# Patient Record
Sex: FEMALE | Race: WHITE | NOT HISPANIC OR LATINO | URBAN - METROPOLITAN AREA
[De-identification: names, ages, dates, MRNs, and addresses within clinical notes are randomized per-mention and may not be internally consistent; named-entity substitution may affect disease eponyms.]

---

## 2024-01-15 LAB
EXTERNAL ABO GROUPING: NORMAL
EXTERNAL ANTIBODY SCREEN: NORMAL
EXTERNAL CHLAMYDIA SCREEN: NEGATIVE
EXTERNAL GONORRHEA SCREEN: NEGATIVE
EXTERNAL HEMATOCRIT: 40.8 %
EXTERNAL HEMOGLOBIN: 13.2 G/DL
EXTERNAL HEPATITIS B SURFACE ANTIGEN: NEGATIVE
EXTERNAL HIV-1/2 AB-AG: NORMAL
EXTERNAL PLATELET COUNT: 266 K/ÂΜL
EXTERNAL RH FACTOR: NEGATIVE
EXTERNAL RUBELLA IGG QUANTITATION: NORMAL
EXTERNAL SYPHILIS TOTAL IGG/IGM SCREENING: NON REACTIVE

## 2024-04-02 ENCOUNTER — INITIAL PRENATAL (OUTPATIENT)
Dept: OBGYN CLINIC | Facility: CLINIC | Age: 29
End: 2024-04-02

## 2024-04-02 VITALS
BODY MASS INDEX: 26.73 KG/M2 | DIASTOLIC BLOOD PRESSURE: 74 MMHG | WEIGHT: 156.6 LBS | SYSTOLIC BLOOD PRESSURE: 112 MMHG | HEIGHT: 64 IN

## 2024-04-02 DIAGNOSIS — Z67.91 RH NEGATIVE, ANTEPARTUM: ICD-10-CM

## 2024-04-02 DIAGNOSIS — Z34.02 PRIMIGRAVIDA, SECOND TRIMESTER: ICD-10-CM

## 2024-04-02 DIAGNOSIS — Z3A.18 18 WEEKS GESTATION OF PREGNANCY: ICD-10-CM

## 2024-04-02 DIAGNOSIS — Z36.1 NEED FOR MATERNAL SERUM ALPHA-PROTEIN (MSAFP) SCREENING: Primary | ICD-10-CM

## 2024-04-02 DIAGNOSIS — O36.1920 KELL ISOIMMUNIZATION DURING PREGNANCY IN SECOND TRIMESTER, SINGLE OR UNSPECIFIED FETUS: ICD-10-CM

## 2024-04-02 DIAGNOSIS — O26.899 RH NEGATIVE, ANTEPARTUM: ICD-10-CM

## 2024-04-02 PROBLEM — O09.899 RUBELLA NON-IMMUNE STATUS, ANTEPARTUM: Status: ACTIVE | Noted: 2024-01-16

## 2024-04-02 PROBLEM — Z28.39 RUBELLA NON-IMMUNE STATUS, ANTEPARTUM: Status: ACTIVE | Noted: 2024-01-16

## 2024-04-02 PROBLEM — Z78.9 NONIMMUNE TO HEPATITIS B VIRUS: Status: ACTIVE | Noted: 2024-01-16

## 2024-04-02 PROBLEM — O36.1990 KELL ISOIMMUNIZATION DURING PREGNANCY: Status: ACTIVE | Noted: 2024-01-18

## 2024-04-02 PROCEDURE — PNV: Performed by: NURSE PRACTITIONER

## 2024-04-02 NOTE — PROGRESS NOTES
[facility] - [address], [address], [place] [address]    Assessment/Plan:  1. Need for maternal serum alpha-protein (MSAFP) screening  -     Alpha fetoprotein, maternal; Future  -     Alpha fetoprotein, maternal    2. Primigravida, second trimester  -     Ambulatory Referral to Maternal Fetal Medicine; Future; Expected date: 2024    3. 18 weeks gestation of pregnancy    4. 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 Rh negative, antepartum    Records scanned into chart, initial prenatal labs completed  18 weeks 0 days, EDC 9/3/2024 by LMP, confirmed by 9 week US on 2024.  Discussed AFP timing, lab slip given  MFM referral, needs anatomy scan  Return for OB intake 1-2 weeks, 2-3 weeks with provider.   Call office with any concerns.    Subjective:   Fabby Tatum is a 28 y.o.  female.  CC: Transfer of prenatal care      HPI:   28 year old  presents for transfer of prenatal care at 18 weeks of pregnancy.  Previous care at [facility], OMAR 9/3/2024 by LMP, confirmed by 9 week ultrasound. Pt states pregnancy uneventful thus far and has no complaints today.  Prenatal labs completed along with NIPT testing, which was negative.         Gyn History  Patient's last menstrual period was 2023 (exact date).       Last pap smear: Not on file    She  reports being sexually active and has had partner(s) who are male. She reports using the following method of birth control/protection: None.       OB History      No past medical history on file.     No past surgical history on file.     Social History     Tobacco Use    Smoking status: Never    Smokeless tobacco: Never   Vaping Use    Vaping status: Never Used   Substance Use Topics    Alcohol use: Not Currently     Alcohol/week: 1.0 standard drink of alcohol     Types: 1 Glasses of wine per week     "Drug use: Never          Current Outpatient Medications:     Ferrous Sulfate (IRON PO), Take 1 tablet by mouth daily, Disp: , Rfl:     Prenatal Vit-Iron Carbonyl-FA (prenatal multivitamin) TABS, , Disp: , Rfl:     She is allergic to minocycline..    ROS: Review of Systems negative except as noted in HPI    Objective:  /74 (BP Location: Left arm, Patient Position: Sitting, Cuff Size: Standard)   Ht 5' 3.5\" (1.613 m)   Wt 71 kg (156 lb 9.6 oz)   LMP 11/30/2023 (Exact Date)   Breastfeeding No   BMI 27.31 kg/m²      Physical Exam  Constitutional:       General: She is not in acute distress.     Appearance: Normal appearance.   Abdominal:      Palpations: Abdomen is soft.      Tenderness: There is no abdominal tenderness.      Comments: Gravid.  FH @ 2 fb below umbilicus.  by doppler.    Skin:     General: Skin is warm and dry.   Neurological:      Mental Status: She is alert.         "

## 2024-04-07 LAB
2ND TRIMESTER 4 SCREEN SERPL-IMP: NORMAL
AFP ADJ MOM SERPL: 1.15
AFP INTERP AMN-IMP: NORMAL
AFP INTERP SERPL-IMP: NORMAL
AFP INTERP SERPL-IMP: NORMAL
AFP SERPL-MCNC: 51.7 NG/ML
AGE AT DELIVERY: 29.7 YR
GA METHOD: NORMAL
GA: 18.3 WEEKS
IDDM PATIENT QL: NO
MULTIPLE PREGNANCY: NO
NEURAL TUBE DEFECT RISK FETUS: 7603 %

## 2024-04-19 ENCOUNTER — ROUTINE PRENATAL (OUTPATIENT)
Facility: HOSPITAL | Age: 29
End: 2024-04-19
Payer: COMMERCIAL

## 2024-04-19 VITALS
HEART RATE: 84 BPM | SYSTOLIC BLOOD PRESSURE: 128 MMHG | HEIGHT: 64 IN | DIASTOLIC BLOOD PRESSURE: 72 MMHG | WEIGHT: 156.4 LBS | BODY MASS INDEX: 26.7 KG/M2

## 2024-04-19 DIAGNOSIS — Z3A.20 20 WEEKS GESTATION OF PREGNANCY: ICD-10-CM

## 2024-04-19 DIAGNOSIS — O36.1920 KELL ISOIMMUNIZATION DURING PREGNANCY IN SECOND TRIMESTER, SINGLE OR UNSPECIFIED FETUS: Primary | ICD-10-CM

## 2024-04-19 PROCEDURE — 99243 OFF/OP CNSLTJ NEW/EST LOW 30: CPT | Performed by: OBSTETRICS & GYNECOLOGY

## 2024-04-19 PROCEDURE — 76817 TRANSVAGINAL US OBSTETRIC: CPT | Performed by: OBSTETRICS & GYNECOLOGY

## 2024-04-19 PROCEDURE — 76811 OB US DETAILED SNGL FETUS: CPT | Performed by: OBSTETRICS & GYNECOLOGY

## 2024-04-19 NOTE — PROGRESS NOTES
Ultrasound Probe Disinfection    A transvaginal ultrasound was performed.   Prior to use, disinfection was performed with High Level Disinfection Process ([employer]).  Probe serial number [id number]: [id number] was used.      Kika Alcaraz  04/19/24  12:36 PM

## 2024-04-19 NOTE — LETTER
April 23, 2024     MARIANA Hallman  [address] [facility]   [address] [place] [id number]    Patient: Fabby Tatum   YOB: 1995   Date of Visit: 4/19/2024       Dear Ms. Moreno:    Thank you for referring Fabby Tatum to me for evaluation. Below are my notes for this consultation.    If you have questions, please do not hesitate to call me. I look forward to following your patient along with you.         Sincerely,        Julien Vivas MD        CC: No Recipients    Julien Vivas MD  4/23/2024  4:29 PM  Sign when Signing Visit  A fetal ultrasound was completed. See Ob procedures in Epic for an interpretation and recommendations. Do not hesitate to contact us in [facility] if you have questions.    Julien Vivas MD, MSCE  Maternal Fetal Medicine

## 2024-04-23 NOTE — PROGRESS NOTES
A fetal ultrasound was completed. See Ob procedures in Epic for an interpretation and recommendations. Do not hesitate to contact us in [facility] if you have questions.    Julien Vivas MD, MSCE  Maternal Fetal Medicine

## 2024-04-25 ENCOUNTER — PATIENT OUTREACH (OUTPATIENT)
Dept: OBGYN CLINIC | Facility: CLINIC | Age: 29
End: 2024-04-25

## 2024-04-25 ENCOUNTER — INITIAL PRENATAL (OUTPATIENT)
Dept: OBGYN CLINIC | Facility: CLINIC | Age: 29
End: 2024-04-25
Payer: COMMERCIAL

## 2024-04-25 VITALS
SYSTOLIC BLOOD PRESSURE: 118 MMHG | DIASTOLIC BLOOD PRESSURE: 68 MMHG | BODY MASS INDEX: 26.12 KG/M2 | HEIGHT: 64 IN | WEIGHT: 153 LBS

## 2024-04-25 VITALS — WEIGHT: 153 LBS | DIASTOLIC BLOOD PRESSURE: 68 MMHG | SYSTOLIC BLOOD PRESSURE: 118 MMHG | BODY MASS INDEX: 26.68 KG/M2

## 2024-04-25 DIAGNOSIS — O36.1920 KELL ISOIMMUNIZATION DURING PREGNANCY IN SECOND TRIMESTER, SINGLE OR UNSPECIFIED FETUS: Primary | ICD-10-CM

## 2024-04-25 DIAGNOSIS — Z3A.21 21 WEEKS GESTATION OF PREGNANCY: Primary | ICD-10-CM

## 2024-04-25 DIAGNOSIS — O09.899 RUBELLA NON-IMMUNE STATUS, ANTEPARTUM: ICD-10-CM

## 2024-04-25 DIAGNOSIS — Z3A.21 21 WEEKS GESTATION OF PREGNANCY: ICD-10-CM

## 2024-04-25 DIAGNOSIS — Z28.39 RUBELLA NON-IMMUNE STATUS, ANTEPARTUM: ICD-10-CM

## 2024-04-25 DIAGNOSIS — O26.899 RH NEGATIVE, ANTEPARTUM: ICD-10-CM

## 2024-04-25 DIAGNOSIS — Z67.91 RH NEGATIVE, ANTEPARTUM: ICD-10-CM

## 2024-04-25 LAB
SL AMB  POCT GLUCOSE, UA: NORMAL
SL AMB POCT URINE PROTEIN: NORMAL

## 2024-04-25 PROCEDURE — 81002 URINALYSIS NONAUTO W/O SCOPE: CPT | Performed by: PHYSICIAN ASSISTANT

## 2024-04-25 PROCEDURE — PNV: Performed by: PHYSICIAN ASSISTANT

## 2024-04-25 PROCEDURE — NOBC: Performed by: PHYSICIAN ASSISTANT

## 2024-04-25 RX ORDER — EPINEPHRINE 0.3 MG/.3ML
INJECTION SUBCUTANEOUS
COMMUNITY
Start: 2023-01-12

## 2024-04-25 NOTE — ASSESSMENT & PLAN NOTE
18 week transfer from [facility] secondary to moving.  Continue routine prenatal care.   Return to office for ob check in 4 weeks.

## 2024-04-25 NOTE — PROGRESS NOTES
Routine Prenatal Visit  [facility] [address], [address], [place] [address]    Assessment/Plan:  Fabby is a 28 y.o. year old  at 21w0d who presents for routine prenatal visit.     1. 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   No risk to baby at this time per genetic consult at [facility], see care everywhere.       2. 21 weeks gestation of pregnancy  Assessment & Plan:  18 week transfer from [facility] secondary to moving.  Continue routine prenatal care.   Return to office for ob check in 4 weeks.     Orders:  -     POCT urine dip    3. Rubella non-immune status, antepartum  Assessment & Plan:  Plan postpartum MMR vaccine.       4. Rh negative, antepartum  Assessment & Plan:  Will plan Rhogam at 28 weeks.           Next OB Visit 4 weeks.    Subjective:     CC: Prenatal care    Fabby Tatum is a 28 y.o.  female who presents for routine prenatal care at 21w0d.  Pregnancy ROS: Good FM, No N/V, HA, cramping, VB, LOF, edema, domestic violence, or smoking. Tolerating PNV.        The following portions of the patient's history were reviewed and updated as appropriate: allergies, current medications, past family history, past medical history, obstetric history, gynecologic history, past social history, past surgical history and problem list.      Objective:  /68   Wt 69.4 kg (153 lb)   LMP 2023   BMI 26.68 kg/m²   Pregravid Weight/BMI: 65.8 kg (145 lb) (BMI 25.28)  Current Weight: 69.4 kg (153 lb)   Total Weight Gain: 3.629 kg (8 lb)   Pre- Vitals      Flowsheet Row Most Recent Value   Prenatal Assessment    Fetal Heart Rate 145   Fundal Height (cm) 21 cm   Movement Present   Prenatal Vitals    Blood Pressure 118/68   Weight - Scale 69.4 kg (153 lb)   Urine Albumin/Glucose    Dilation/Effacement/Station     Vaginal Drainage    Edema    LLE Edema None   RLE Edema None   Facial Edema None             General: Well appearing, no distress  Abdomen: Soft, gravid, nontender  Fundal Height: Appropriate for gestational age.  Extremities: Warm and well perfused.  Non tender.  Prenatal lab work scripts

## 2024-04-25 NOTE — PATIENT INSTRUCTIONS
Congratulations!! Please review our Pregnancy Essential Guide and [facility] L&D Virtual tour from our networks website.     [facility] Pregnancy Essentials Guide  [facility] ([email or web address])     [facility] ([employer])

## 2024-04-25 NOTE — ASSESSMENT & PLAN NOTE
Patient brought husbands testing confirming negative for K antigen, positive for k (lowercase) antigen.   No risk to baby at this time per genetic consult at [facility], see care everywhere.

## 2024-04-25 NOTE — PROGRESS NOTES
SW referred for initial prenatal assessment. Patient is , [id number] with an OMAR of 9/15/24. Patient is a transfer from [facility]. Patient presented with FOB ( Héctor), agreeable to speaking with SW.     Patient reports this is a planned pregnancy. She and FOB both work and drive. Patient denies needing information for MA/WIC/SNAP, parenting education, or baby supply resources today. Patient denies current or h/o mental health, substance use, DV/IPV, CYS involvement, and legal concerns. She indicates good support from FOB, friends, and family. She enjoys reading and shopping for stress relief.     No SW needs identified at this time, SW closing referral. Please re-refer as needed.

## 2024-04-25 NOTE — PROGRESS NOTES
OB INTAKE INTERVIEW  Patient is 28 y.o.y.o. who presents for OB intake at 21wks  She is accompanied by   during this encounter  The father of her baby (Héctor ) is  involved in the pregnancy and is 28 years old.      Last Menstrual Period: 2024 Ultrasound: Measured 9 weeks 6 days     Estimated Date of Delivery: 2024     Signs/Symptoms of Pregnancy  Current pregnancy symptoms:   Constipation no  Headaches no  Cramping/spotting no  PICA cravings no    Diabetes-  There is no height or weight on file to calculate BMI.  If patient has 1 or more, please order early 1 hour GTT  History of GDM no  BMI >35 no  History of PCOS or current metformin use no  History of LGA/macrosomic infant (4000g/9lbs) no    If patient has 2 or more, please order early 1 hour GTT  BMI>30 no  AMA no  First degree relative with type 2 diabetes no  History of chronic HTN, hyperlipidemia, elevated A1C no  High risk race (, , ,  or ) no    Hypertension- if you answer yes to any of the following, please order baseline preeclampsia labs (cbc, comprehensive metabolic panel, urine protein creatinine ratio, uric acid)  History of of chronic HTN no  History of gestational HTN no  History of preeclampsia, eclampsia, or HELLP syndrome no  History of diabetes no  History of lupus, autoimmune disease, kidney disease no    Thyroid- if yes order TSH with reflex T4  History of thyroid disease no    Bleeding Disorder or Hx of DVT-patient or first degree relative with history of. Order the following if not done previously.   (Factor V, antithrombin III, prothrombin gene mutation, protein C and S Ag, lupus anticoagulant, anticardiolipin, beta-2 glycoprotein)   no    OB/GYN-  History of abnormal pap smear no       Date of last pap smear 2023, Neg pap   History of HPV no  History of Herpes/HSV no  History of other STI (gonorrhea, chlamydia, trich) no  History of prior   no  History of prior  no  History of  delivery prior to 36 weeks 6 days no  History of blood transfusion no  Ok for blood transfusion Yes     Substance screening-   History of tobacco use no  Currently using tobacco no  Substance Use Screen Level:; No risk     MRSA Screening-   Does the pt have a hx of MRSA? no    Immunizations:  Influenza vaccine given this season yes, 2023   Discussed Tdap vaccine YES  Discussed COVID Vaccine Yes     Genetic/MFM-  Do you or your partner have a history of any of the following in yourselves or first degree relatives?  Cystic fibrosis no  Spinal muscular atrophy no  Hemoglobinopathy/Sickle Cell/Thalassemia no  Fragile X Intellectual Disability no    If yes, discuss Carrier Screening and recommend consultation with MFM/Genetic Counseling and place specific MFM Referral for.    If no, discuss Carrier Screening being completed once in a lifetime as a standard of care lab test. Place orders for Cystic Fibrosis Gene Test (ZZD464) and Spinal Muscular Atrophy DNA ([id number])    Had declined CF/SMA screening due to cost and insurance coverage.      Appointment for Nuchal Translucency Ultrasound at [facility] scheduled for Pt was seen by MFM on 24 and has follow- up appointment scheduled for 24.  NIPT done on 24-Reduced risk   MSAFP completed on 24-Reduced Risk   Interview education  [facility] Pregnancy Essentials Book reviewed, discussed and attached to their AVS Yes     Ambulatory referral to  placed   EPDS: 2       Prenatal lab work scripts  PN labs completed on 1/15/24  CBC-WNL   UA-LE-25, few bacteria   RPR-non reactive  HIV-non reactive  Hep B surface antibody--3.9   Hep B surface antigen -non reactive   RPR- non reactive   Urine chlamydia-negative  Urine gonorrhea-not detected    K antigen titer 16   Negative for K antigen   Blood type: A negative     Extra labs ordered:      Aspirin/Preeclampsia Screen    Risk Level Risk Factor Recommendation    LOW Prior Uncomplicated full-term delivery no No Aspirin recommendation        MODERATE Nulliparity YES Recommend low-dose aspirin if     BMI>30 no 2 or more moderate risk factors    Family History Preeclampsia (mother/sister) no     35yr old or greater no      or Low Socioeconomic no     IVF Pregnancy  no     Personal History Risks (low birth weight, prior adverse preg outcome, >10yr preg interval) no         HIGH History of Preeclampsia no Recommend low-dose aspirin if     Multifetal gestation no 1 or more high risk factors    Chronic HTN no     Type 1 or 2 Diabetes no     Renal Disease no     Autoimmune Disease  no      Contraindications to ASA therapy:  NSAID/ ASA allergy: no  Nasal polyps: no  Asthma with history of ASA induced bronchospasm: no  Relative contraindications:  History of GI bleed: no  Active peptic ulcer disease: no  Severe hepatic dysfunction: no    Patient should be recommended to take ASA 162mg during this pregnancy from 12-36wks to lower her risk of preeclampsia:  Low Risk       The patient has a history now or in prior pregnancy notable for:  Primigravida   See Below     Details that I feel the provider should be aware of:   Fabby is a 21 week OB transfer from [facility]. She desires to establish OB care and deliver at [facility].  All PN labs including NIPT and MSAFP completed, result are on the chart for review.   Adeola isoimmunization during pregnancy-[facility] request documentation of partner Héctor's testing to confirm he is Adeola negative. If the documentation cannot be procured then genetic counseling is recommended to ensure that all the information that is required is available to indeed confirm that the fetus is not at risk for [place] immunization.  Pt provided a copy of husbands blood work from 1/17/2024-Negative for K Antigen (copy scanned into chart)     RH incompatibility (A negative)   Follow-US recommended at 35 weeks-Scheduled for  8/2/24    PN1 visit scheduled  today after Intake. Reports given to Sandeep BETTENCOURT  The patient was oriented to our practice, the navigator role, reviewed delivering physicians and [facility]. All questions were answered.    Interviewed by: COLLINS Gregg RN

## 2024-04-30 ENCOUNTER — TELEPHONE (OUTPATIENT)
Dept: OBGYN CLINIC | Facility: CLINIC | Age: 29
End: 2024-04-30

## 2024-05-24 ENCOUNTER — ROUTINE PRENATAL (OUTPATIENT)
Dept: OBGYN CLINIC | Facility: CLINIC | Age: 29
End: 2024-05-24
Payer: COMMERCIAL

## 2024-05-24 VITALS
DIASTOLIC BLOOD PRESSURE: 68 MMHG | SYSTOLIC BLOOD PRESSURE: 120 MMHG | BODY MASS INDEX: 27.04 KG/M2 | WEIGHT: 158.4 LBS | HEIGHT: 64 IN

## 2024-05-24 DIAGNOSIS — O09.899 RUBELLA NON-IMMUNE STATUS, ANTEPARTUM: ICD-10-CM

## 2024-05-24 DIAGNOSIS — Z3A.25 25 WEEKS GESTATION OF PREGNANCY: ICD-10-CM

## 2024-05-24 DIAGNOSIS — Z28.39 RUBELLA NON-IMMUNE STATUS, ANTEPARTUM: ICD-10-CM

## 2024-05-24 DIAGNOSIS — Z78.9 NONIMMUNE TO HEPATITIS B VIRUS: ICD-10-CM

## 2024-05-24 DIAGNOSIS — Z36.89 ENCOUNTER FOR OTHER SPECIFIED ANTENATAL SCREENING: Primary | ICD-10-CM

## 2024-05-24 LAB
SL AMB  POCT GLUCOSE, UA: NORMAL
SL AMB POCT URINE PROTEIN: NORMAL

## 2024-05-24 PROCEDURE — PNV: Performed by: OBSTETRICS & GYNECOLOGY

## 2024-05-24 PROCEDURE — 81002 URINALYSIS NONAUTO W/O SCOPE: CPT | Performed by: OBSTETRICS & GYNECOLOGY

## 2024-05-24 NOTE — PROGRESS NOTES
"Routine Prenatal Visit  [facility] - [address], [address], [place] [address]    Assessment/Plan:  Fabby is a 28 y.o. year old  at 25w1d who presents for routine prenatal visit.     1. Encounter for other specified  screening  -     Glucose, 1H PG; Future  -     ABO/Rh; Future  -     CBC; Future  -     RPR, Rfx Qn RPR/Confirm TP; Future  -     Glucose, 1H PG  -     ABO/Rh  -     CBC  -     RPR, Rfx Qn RPR/Confirm TP  2. 25 weeks gestation of pregnancy  -     POCT urine dip  3. Rubella non-immune status, antepartum  4. Nonimmune to hepatitis B virus      Next OB Visit 4 weeks.    Subjective:     CC: Prenatal care    Fabby Tatum is a 28 y.o.  female who presents for routine prenatal care at 25w1d.  Pregnancy ROS: no leakage of fluid, pelvic pain, or vaginal bleeding.  normal fetal movement.    The following portions of the patient's history were reviewed and updated as appropriate: allergies, current medications, past family history, past medical history, obstetric history, gynecologic history, past social history, past surgical history and problem list.      Objective:  /68 (BP Location: Left arm, Patient Position: Sitting, Cuff Size: Standard)   Ht 5' 3.5\" (1.613 m)   Wt 71.8 kg (158 lb 6.4 oz)   LMP 2023   BMI 27.62 kg/m²   Pregravid Weight/BMI: 65.8 kg (145 lb) (BMI 25.28)  Current Weight: 71.8 kg (158 lb 6.4 oz)   Total Weight Gain: 6.078 kg (13 lb 6.4 oz)   Pre-Shun Vitals      Flowsheet Row Most Recent Value   Prenatal Assessment    Fetal Heart Rate 140   Fundal Height (cm) 25 cm   Movement Present   Prenatal Vitals    Blood Pressure 120/68   Weight - Scale 71.8 kg (158 lb 6.4 oz)   Urine Albumin/Glucose    Dilation/Effacement/Station    Vaginal Drainage    Draining Fluid No   Edema              General: Well appearing, no distress  Abdomen: Soft, gravid, nontender  Extremities: Non tender.  "

## 2024-06-12 ENCOUNTER — ROUTINE PRENATAL (OUTPATIENT)
Dept: OBGYN CLINIC | Facility: CLINIC | Age: 29
End: 2024-06-12
Payer: COMMERCIAL

## 2024-06-12 VITALS
SYSTOLIC BLOOD PRESSURE: 110 MMHG | WEIGHT: 160 LBS | DIASTOLIC BLOOD PRESSURE: 76 MMHG | HEIGHT: 64 IN | BODY MASS INDEX: 27.31 KG/M2

## 2024-06-12 DIAGNOSIS — O36.1920 KELL ISOIMMUNIZATION DURING PREGNANCY IN SECOND TRIMESTER, SINGLE OR UNSPECIFIED FETUS: Primary | ICD-10-CM

## 2024-06-12 DIAGNOSIS — O26.899 RH NEGATIVE, ANTEPARTUM: ICD-10-CM

## 2024-06-12 DIAGNOSIS — Z3A.27 27 WEEKS GESTATION OF PREGNANCY: ICD-10-CM

## 2024-06-12 DIAGNOSIS — Z28.39 RUBELLA NON-IMMUNE STATUS, ANTEPARTUM: ICD-10-CM

## 2024-06-12 DIAGNOSIS — Z67.91 RH NEGATIVE, ANTEPARTUM: ICD-10-CM

## 2024-06-12 DIAGNOSIS — O09.899 RUBELLA NON-IMMUNE STATUS, ANTEPARTUM: ICD-10-CM

## 2024-06-12 DIAGNOSIS — Z78.9 NONIMMUNE TO HEPATITIS B VIRUS: ICD-10-CM

## 2024-06-12 LAB
SL AMB  POCT GLUCOSE, UA: NORMAL
SL AMB POCT URINE PROTEIN: NORMAL

## 2024-06-12 PROCEDURE — PNV: Performed by: STUDENT IN AN ORGANIZED HEALTH CARE EDUCATION/TRAINING PROGRAM

## 2024-06-12 PROCEDURE — 81002 URINALYSIS NONAUTO W/O SCOPE: CPT | Performed by: STUDENT IN AN ORGANIZED HEALTH CARE EDUCATION/TRAINING PROGRAM

## 2024-06-12 NOTE — ASSESSMENT & PLAN NOTE
- Received 1 does of immunization 2/5/2024. Recommend that she completes this series with her PCP.

## 2024-06-12 NOTE — ASSESSMENT & PLAN NOTE
- Partner's antigen status is confirmed to be negative for K (big) antigen. Result scanned in Media tab. Per MFM at [facility], no further testing is indicated, as fetus is not at risk.

## 2024-06-12 NOTE — ASSESSMENT & PLAN NOTE
- PTL/PPROM/Bleeding precautions given.   - MFM consult report from level II ultrasound reviewed. Repeat US is scheduled in 3rd trimester, per [facility] recommendations.  - 28 week labs previously ordered. Patient plans to have these drawn tomorrow.   - Problem list updated, results console reviewed and updated with pertinent prenatal labs.  - PMH, PSH, medications reviewed and updated as needed.  - Return to office in 1 week for Rhogam, then 2 weeks for routine prenatal care.

## 2024-06-12 NOTE — PROGRESS NOTES
"Routine Prenatal Visit  [facility] - [address], [address], [place] [address]    Assessment/Plan:  Fabby is a 28 y.o. year old  at 27w6d who presents for routine prenatal visit.     1. 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 Result scanned in Media tab. Per MFM at [facility], no further testing is indicated, as fetus is not at risk.   2. Rh negative, antepartum  Assessment & Plan:  - Patient to return next week for Rhogam, as she plans to have labs drawn tomorrow.   3. Rubella non-immune status, antepartum  Assessment & Plan:  - Plan for MMR postpartum.   4. Nonimmune to hepatitis B virus  Assessment & Plan:  - Received 1 does of immunization 2024. Recommend that she completes this series with her PCP.   5. 27 weeks gestation of pregnancy  Assessment & Plan:  - PTL/PPROM/Bleeding precautions given.   - MFM consult report from level II ultrasound reviewed. Repeat US is scheduled in 3rd trimester, per MFM recommendations.  - 28 week labs previously ordered. Patient plans to have these drawn tomorrow.   - Problem list updated, results console reviewed and updated with pertinent prenatal labs.  - PMH, PSH, medications reviewed and updated as needed.  - Return to office in 1 week for Rhogam, then 2 weeks for routine prenatal care.   Orders:  -     POCT urine dip        Subjective:   Fabby Tatum is a 28 y.o.  who presents for routine prenatal care at 27w6d.  Complaints today: None  LOF: No; VB: No; Contractions: No; FM: Present    Objective:  /76 (BP Location: Left arm, Patient Position: Sitting, Cuff Size: Standard)   Ht 5' 3.5\" (1.613 m)   Wt 72.6 kg (160 lb)   LMP 2023   BMI 27.90 kg/m²     General: Well appearing, no distress  Respiratory: Unlabored breathing  Cardiovascular: Regular rate.  Abdomen: Soft, gravid, " nontender  Extremities: Warm and well perfused.  Non tender.    Pregravid Weight/BMI: 65.8 kg (145 lb) (BMI 25.28)  Current Weight: 72.6 kg (160 lb)   Total Weight Gain: 6.804 kg (15 lb)     Pre- Vitals      Flowsheet Row Most Recent Value   Prenatal Assessment    Fetal Heart Rate 145   Fundal Height (cm) 27 cm   Movement Present   Prenatal Vitals    Blood Pressure 110/76   Weight - Scale 72.6 kg (160 lb)   Urine Albumin/Glucose    Dilation/Effacement/Station    Vaginal Drainage    Draining Fluid No   Edema    LLE Edema None   RLE Edema None   Facial Edema None             Isaias Alvarado MD  2024 6:15 PM

## 2024-06-13 LAB
EXTERNAL HEMOGLOBIN: 12.4 G/DL
EXTERNAL PLATELET COUNT: 281 K/ÂΜL
EXTERNAL RH FACTOR: NEGATIVE
EXTERNAL SYPHILIS TOTAL IGG/IGM SCREENING: NORMAL

## 2024-06-14 LAB
ABO GROUP BLD: NORMAL
ERYTHROCYTE [DISTWIDTH] IN BLOOD BY AUTOMATED COUNT: 12.6 % (ref 11.7–15.4)
GLUCOSE 1H P 50 G GLC PO SERPL-MCNC: 101 MG/DL (ref 70–139)
HCT VFR BLD AUTO: 38.1 % (ref 34–46.6)
HGB BLD-MCNC: 12.4 G/DL (ref 11.1–15.9)
MCH RBC QN AUTO: 28.3 PG (ref 26.6–33)
MCHC RBC AUTO-ENTMCNC: 32.5 G/DL (ref 31.5–35.7)
MCV RBC AUTO: 87 FL (ref 79–97)
PLATELET # BLD AUTO: 281 X10E3/UL (ref 150–450)
RBC # BLD AUTO: 4.38 X10E6/UL (ref 3.77–5.28)
RH BLD: NEGATIVE
RPR SER QL: NON REACTIVE
WBC # BLD AUTO: 8.2 X10E3/UL (ref 3.4–10.8)

## 2024-06-17 ENCOUNTER — CLINICAL SUPPORT (OUTPATIENT)
Dept: POSTPARTUM | Facility: CLINIC | Age: 29
End: 2024-06-17

## 2024-06-17 DIAGNOSIS — Z32.2 ENCOUNTER FOR CHILDBIRTH INSTRUCTION: Primary | ICD-10-CM

## 2024-06-19 ENCOUNTER — CLINICAL SUPPORT (OUTPATIENT)
Dept: OBGYN CLINIC | Facility: CLINIC | Age: 29
End: 2024-06-19
Payer: COMMERCIAL

## 2024-06-19 VITALS — WEIGHT: 160 LBS | HEIGHT: 64 IN | BODY MASS INDEX: 27.31 KG/M2

## 2024-06-19 DIAGNOSIS — Z23 ENCOUNTER FOR IMMUNIZATION: Primary | ICD-10-CM

## 2024-06-19 DIAGNOSIS — Z29.13 NEED FOR RHOGAM DUE TO RH NEGATIVE MOTHER: ICD-10-CM

## 2024-06-19 LAB — EXTERNAL RHOGAM GIVEN?: YES

## 2024-06-19 PROCEDURE — 96372 THER/PROPH/DIAG INJ SC/IM: CPT

## 2024-06-19 PROCEDURE — 90471 IMMUNIZATION ADMIN: CPT

## 2024-06-19 PROCEDURE — 90715 TDAP VACCINE 7 YRS/> IM: CPT

## 2024-06-19 PROCEDURE — 90384 RH IG FULL-DOSE IM: CPT

## 2024-06-24 ENCOUNTER — TELEPHONE (OUTPATIENT)
Dept: OBGYN CLINIC | Facility: CLINIC | Age: 29
End: 2024-06-24

## 2024-06-24 NOTE — TELEPHONE ENCOUNTER
3rd trimester call - 29 wks 4 days - left message patient's voicemail to recall office    Patient only has next appointment on 6/27/2024 scheduled  Had Rhogam & TDAP on 6/16/2024  3rd tri labs completed 6/13/2024 (wnl)  Has follow up US scheduled for 8/2/2024

## 2024-06-27 ENCOUNTER — ROUTINE PRENATAL (OUTPATIENT)
Dept: OBGYN CLINIC | Facility: CLINIC | Age: 29
End: 2024-06-27
Payer: COMMERCIAL

## 2024-06-27 VITALS — SYSTOLIC BLOOD PRESSURE: 110 MMHG | DIASTOLIC BLOOD PRESSURE: 62 MMHG | WEIGHT: 159.8 LBS | BODY MASS INDEX: 27.86 KG/M2

## 2024-06-27 DIAGNOSIS — Z3A.30 30 WEEKS GESTATION OF PREGNANCY: Primary | ICD-10-CM

## 2024-06-27 DIAGNOSIS — Z67.91 RH NEGATIVE, ANTEPARTUM: ICD-10-CM

## 2024-06-27 DIAGNOSIS — O36.1930 KELL ISOIMMUNIZATION DURING PREGNANCY IN THIRD TRIMESTER, SINGLE OR UNSPECIFIED FETUS: ICD-10-CM

## 2024-06-27 DIAGNOSIS — Z28.39 RUBELLA NON-IMMUNE STATUS, ANTEPARTUM: ICD-10-CM

## 2024-06-27 DIAGNOSIS — O26.899 RH NEGATIVE, ANTEPARTUM: ICD-10-CM

## 2024-06-27 DIAGNOSIS — O09.899 RUBELLA NON-IMMUNE STATUS, ANTEPARTUM: ICD-10-CM

## 2024-06-27 LAB
SL AMB  POCT GLUCOSE, UA: NEGATIVE
SL AMB POCT URINE PROTEIN: NEGATIVE

## 2024-06-27 PROCEDURE — PNV: Performed by: NURSE PRACTITIONER

## 2024-06-27 PROCEDURE — 81002 URINALYSIS NONAUTO W/O SCOPE: CPT | Performed by: NURSE PRACTITIONER

## 2024-06-27 NOTE — TELEPHONE ENCOUNTER
"3rd trimester call -placed.        Overall how are you feeling?   Pt was seen today for 30 wk OB appointment, \"feeling good and no complaints.\"     Compliant with routine OB appointments? yes    Have you completed your 3rd trimester lab work?   3rd tri labs completed 6/13/2024 (wnl)  Had Rhogam & TDAP on 6/16/2024    Has follow up US scheduled for 8/2/2024  Have you reviewed the contents of 3rd trimester folder from office?    Have you decided on a pediatrician? YEs    If yes, who [facility]    If no, what are you looking for and request sent for outreach.   Questions on paperwork to go back to office? Received today 6/27/24, will return at her next appointment    Questions on the baby birth certificate forms?  Received today 6/27/24, will return at her next appointment    EPDS Scrore 0    Send link for the Hospital Readiness Video via [employer]      "

## 2024-06-27 NOTE — PROGRESS NOTES
Routine Prenatal Visit  [facility] - [address], [place] [address]    Assessment/Plan:  Fabby is a 28 y.o. year old  at 30w0d who presents for routine prenatal visit.     1. 30 weeks gestation of pregnancy  -     POCT urine dip  2. Rubella non-immune status, antepartum  Assessment & Plan:  Offer postpartum vaccine  3. Rh negative, antepartum  Assessment & Plan:  S/P Rhogam  4. [place] isoimmunization during pregnancy in third trimester, single or unspecified fetus  Assessment & Plan:  All records received, reviewed by MFM. Fetus not at risk  Growth US at 35 weeks    Doing well  Reviewed S/S  FMC, PTL  Call with any concerns  Next OB Visit 2 weeks.    Subjective:     CC: Prenatal care    Fabby Tatum is a 28 y.o.  female who presents for routine prenatal care at 30w0d. She has no complaints, feels well.   Pregnancy ROS: no leakage of fluid, pelvic pain, or vaginal bleeding.  normal fetal movement.    The following portions of the patient's history were reviewed and updated as appropriate: allergies, current medications, past family history, past medical history, obstetric history, gynecologic history, past social history, past surgical history and problem list.      Objective:  /62   Wt 72.5 kg (159 lb 12.8 oz)   LMP 2023   BMI 27.86 kg/m²   Pregravid Weight/BMI: 65.8 kg (145 lb) (BMI 25.28)  Current Weight: 72.5 kg (159 lb 12.8 oz)   Total Weight Gain: 6.713 kg (14 lb 12.8 oz)   Pre- Vitals      Flowsheet Row Most Recent Value   Prenatal Assessment    Fetal Heart Rate 146   Fundal Height (cm) 30 cm   Movement Present   Presentation Vertex   Prenatal Vitals    Blood Pressure 110/62   Weight - Scale 72.5 kg (159 lb 12.8 oz)   Urine Albumin/Glucose    Dilation/Effacement/Station    Vaginal Drainage    Draining Fluid No   Edema    LLE Edema None   RLE Edema None   Facial Edema None             General: Well appearing, no distress  Abdomen: Soft, gravid,  nontender  Extremities: Non tender.

## 2024-07-11 ENCOUNTER — ROUTINE PRENATAL (OUTPATIENT)
Dept: OBGYN CLINIC | Facility: CLINIC | Age: 29
End: 2024-07-11
Payer: COMMERCIAL

## 2024-07-11 VITALS
HEIGHT: 64 IN | WEIGHT: 162.8 LBS | BODY MASS INDEX: 27.79 KG/M2 | SYSTOLIC BLOOD PRESSURE: 116 MMHG | DIASTOLIC BLOOD PRESSURE: 82 MMHG

## 2024-07-11 DIAGNOSIS — Z28.39 RUBELLA NON-IMMUNE STATUS, ANTEPARTUM: ICD-10-CM

## 2024-07-11 DIAGNOSIS — Z3A.32 32 WEEKS GESTATION OF PREGNANCY: ICD-10-CM

## 2024-07-11 DIAGNOSIS — O26.899 RH NEGATIVE, ANTEPARTUM: ICD-10-CM

## 2024-07-11 DIAGNOSIS — O09.899 RUBELLA NON-IMMUNE STATUS, ANTEPARTUM: ICD-10-CM

## 2024-07-11 DIAGNOSIS — O36.1930 KELL ISOIMMUNIZATION DURING PREGNANCY IN THIRD TRIMESTER, SINGLE OR UNSPECIFIED FETUS: Primary | ICD-10-CM

## 2024-07-11 DIAGNOSIS — Z67.91 RH NEGATIVE, ANTEPARTUM: ICD-10-CM

## 2024-07-11 LAB
SL AMB  POCT GLUCOSE, UA: NORMAL
SL AMB POCT URINE PROTEIN: NORMAL

## 2024-07-11 PROCEDURE — PNV: Performed by: OBSTETRICS & GYNECOLOGY

## 2024-07-11 PROCEDURE — 81002 URINALYSIS NONAUTO W/O SCOPE: CPT | Performed by: OBSTETRICS & GYNECOLOGY

## 2024-07-11 NOTE — PROGRESS NOTES
"Routine Prenatal Visit  [facility] - [place]  [address] [place] [address]    Assessment/Plan:  Fabby is a 28 y.o. year old  at 32w0d who presents for routine prenatal visit.     1. Adeola isoimmunization during pregnancy in third trimester, single or unspecified fetus  Assessment & Plan:  Has growth for 35 weeks scheduled. Reviewed both our MFM and previous MFM records.   2. 32 weeks gestation of pregnancy  -     POCT urine dip  3. Rh negative, antepartum  4. Rubella non-immune status, antepartum        Subjective:   Fabby Tatum is a 28 y.o.  who presents for routine prenatal care at 32w0d.  Complaints today: Denies  LOF: -; VB: -; Contractions: -; FM: +    Objective:  /82 (BP Location: Left arm, Patient Position: Sitting, Cuff Size: Standard)   Ht 5' 3.5\" (1.613 m)   Wt 73.8 kg (162 lb 12.8 oz)   LMP 2023   BMI 28.39 kg/m²     General: Well appearing, no distress  Respiratory: Unlabored breathing  Abdomen: Soft, gravid, nontender  Extremities: Warm and well perfused.  Non tender.    Pregravid Weight/BMI: 65.8 kg (145 lb) (BMI 25.28)  Current Weight: 73.8 kg (162 lb 12.8 oz)   Total Weight Gain: 8.074 kg (17 lb 12.8 oz)     Pre-Shun Vitals      Flowsheet Row Most Recent Value   Prenatal Assessment    Fetal Heart Rate 133   Fundal Height (cm) 32 cm   Movement Present   Prenatal Vitals    Blood Pressure 116/82   Weight - Scale 73.8 kg (162 lb 12.8 oz)   Urine Albumin/Glucose    Dilation/Effacement/Station    Vaginal Drainage    Edema              Shil ZEFERINO Green, DO  2024 12:59 PM     "

## 2024-07-12 ENCOUNTER — TELEPHONE (OUTPATIENT)
Age: 29
End: 2024-07-12

## 2024-07-12 NOTE — TELEPHONE ENCOUNTER
Called and spoke to patient. Informed her that unfortunately no available appointments in [place]. Will add patient to waitlist. She states understanding and will keep 8/2 appointment if nothing else becomes available.

## 2024-07-12 NOTE — TELEPHONE ENCOUNTER
Patient is looking for [place] location.    Coming in for 8/2      [place].        She is aware as of right now no appointment until 8/19 in this location .          Please call if any cancellation

## 2024-07-18 ENCOUNTER — CLINICAL SUPPORT (OUTPATIENT)
Age: 29
End: 2024-07-18

## 2024-07-18 DIAGNOSIS — Z32.2 ENCOUNTER FOR CHILDBIRTH INSTRUCTION: Primary | ICD-10-CM

## 2024-07-25 ENCOUNTER — ROUTINE PRENATAL (OUTPATIENT)
Dept: OBGYN CLINIC | Facility: CLINIC | Age: 29
End: 2024-07-25
Payer: COMMERCIAL

## 2024-07-25 VITALS
HEIGHT: 64 IN | DIASTOLIC BLOOD PRESSURE: 72 MMHG | BODY MASS INDEX: 28 KG/M2 | SYSTOLIC BLOOD PRESSURE: 112 MMHG | WEIGHT: 164 LBS

## 2024-07-25 DIAGNOSIS — O26.899 RH NEGATIVE, ANTEPARTUM: ICD-10-CM

## 2024-07-25 DIAGNOSIS — Z28.39 RUBELLA NON-IMMUNE STATUS, ANTEPARTUM: ICD-10-CM

## 2024-07-25 DIAGNOSIS — Z67.91 RH NEGATIVE, ANTEPARTUM: ICD-10-CM

## 2024-07-25 DIAGNOSIS — O36.1930 KELL ISOIMMUNIZATION DURING PREGNANCY IN THIRD TRIMESTER, SINGLE OR UNSPECIFIED FETUS: Primary | ICD-10-CM

## 2024-07-25 DIAGNOSIS — Z3A.34 34 WEEKS GESTATION OF PREGNANCY: ICD-10-CM

## 2024-07-25 DIAGNOSIS — O09.899 RUBELLA NON-IMMUNE STATUS, ANTEPARTUM: ICD-10-CM

## 2024-07-25 LAB
SL AMB  POCT GLUCOSE, UA: NORMAL
SL AMB POCT URINE PROTEIN: NORMAL

## 2024-07-25 PROCEDURE — 81002 URINALYSIS NONAUTO W/O SCOPE: CPT | Performed by: OBSTETRICS & GYNECOLOGY

## 2024-07-25 PROCEDURE — PNV: Performed by: OBSTETRICS & GYNECOLOGY

## 2024-07-25 NOTE — PROGRESS NOTES
"Routine Prenatal Visit  [facility] - [place]  [address], [place] [address]    Assessment/Plan:  Fabby is a 28 y.o. year old  at 34w0d who presents for routine prenatal visit.     1. 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 Rh negative, antepartum  3. Rubella non-immune status, antepartum  4. 34 weeks gestation of pregnancy  -     POCT urine dip        Subjective:     CC: Prenatal care    Fabby Tatum is a 28 y.o.  female who presents for routine prenatal care at 34w0d.  Pregnancy ROS: no leakage of fluid, pelvic pain, or vaginal bleeding.  normal fetal movement.    The following portions of the patient's history were reviewed and updated as appropriate: allergies, current medications, past family history, past medical history, obstetric history, gynecologic history, past social history, past surgical history and problem list.      Objective:  /72 (BP Location: Left arm, Patient Position: Sitting, Cuff Size: Standard)   Ht 5' 3.5\" (1.613 m)   Wt 74.4 kg (164 lb)   LMP 2023   BMI 28.60 kg/m²   Pregravid Weight/BMI: 65.8 kg (145 lb) (BMI 25.28)  Current Weight: 74.4 kg (164 lb)   Total Weight Gain: 8.618 kg (19 lb)   Pre-Shun Vitals      Flowsheet Row Most Recent Value   Prenatal Assessment    Fetal Heart Rate 140   Fundal Height (cm) 34 cm   Movement Present   Presentation Vertex   Prenatal Vitals    Blood Pressure 112/72   Weight - Scale 74.4 kg (164 lb)   Urine Albumin/Glucose    Dilation/Effacement/Station    Vaginal Drainage    Edema              General: Well appearing, no distress  Respiratory: Unlabored breathing  Cardiovascular: Regular rate.  Abdomen: Soft, gravid, nontender  Fundal Height: Appropriate for gestational age.  Extremities: Warm and well perfused.  Non tender.  "

## 2024-08-02 ENCOUNTER — ULTRASOUND (OUTPATIENT)
Facility: HOSPITAL | Age: 29
End: 2024-08-02
Payer: COMMERCIAL

## 2024-08-02 VITALS
WEIGHT: 165 LBS | HEART RATE: 70 BPM | BODY MASS INDEX: 28.77 KG/M2 | DIASTOLIC BLOOD PRESSURE: 68 MMHG | SYSTOLIC BLOOD PRESSURE: 120 MMHG

## 2024-08-02 DIAGNOSIS — Z3A.35 35 WEEKS GESTATION OF PREGNANCY: Primary | ICD-10-CM

## 2024-08-02 DIAGNOSIS — O36.1930 KELL ISOIMMUNIZATION DURING PREGNANCY IN THIRD TRIMESTER, SINGLE OR UNSPECIFIED FETUS: ICD-10-CM

## 2024-08-02 DIAGNOSIS — Z36.89 ENCOUNTER FOR ULTRASOUND TO CHECK FETAL GROWTH: ICD-10-CM

## 2024-08-02 PROCEDURE — 76816 OB US FOLLOW-UP PER FETUS: CPT | Performed by: STUDENT IN AN ORGANIZED HEALTH CARE EDUCATION/TRAINING PROGRAM

## 2024-08-02 NOTE — PROGRESS NOTES
This patient received  care under my supervision on 24 at 35w1d gestational age at [facility].  The note is contained in the ultrasound report located under OB Procedures tab in Epic.  Please call our office at [phone] with questions.  -Aline Goldstein MD

## 2024-08-07 ENCOUNTER — TELEPHONE (OUTPATIENT)
Age: 29
End: 2024-08-07

## 2024-08-07 NOTE — TELEPHONE ENCOUNTER
Patient called the RX Refill Line. Message is being forwarded to the office.     Patient is requesting an order/prescription for a breast pump. She said she was given the order form from the office and filled it out electronically. However, the pump cannot get sent to her until the company gets a prescription from the office.    Please fax to:  [employer]  Fax# [phone]

## 2024-08-08 ENCOUNTER — ROUTINE PRENATAL (OUTPATIENT)
Dept: OBGYN CLINIC | Facility: CLINIC | Age: 29
End: 2024-08-08
Payer: COMMERCIAL

## 2024-08-08 VITALS
WEIGHT: 167 LBS | HEIGHT: 64 IN | BODY MASS INDEX: 28.51 KG/M2 | DIASTOLIC BLOOD PRESSURE: 78 MMHG | SYSTOLIC BLOOD PRESSURE: 120 MMHG

## 2024-08-08 DIAGNOSIS — O09.899 RUBELLA NON-IMMUNE STATUS, ANTEPARTUM: ICD-10-CM

## 2024-08-08 DIAGNOSIS — Z36.85 ANTENATAL SCREENING FOR STREPTOCOCCUS B: ICD-10-CM

## 2024-08-08 DIAGNOSIS — O36.1930 KELL ISOIMMUNIZATION DURING PREGNANCY IN THIRD TRIMESTER, SINGLE OR UNSPECIFIED FETUS: Primary | ICD-10-CM

## 2024-08-08 DIAGNOSIS — Z3A.36 36 WEEKS GESTATION OF PREGNANCY: ICD-10-CM

## 2024-08-08 DIAGNOSIS — Z28.39 RUBELLA NON-IMMUNE STATUS, ANTEPARTUM: ICD-10-CM

## 2024-08-08 DIAGNOSIS — Z67.91 RH NEGATIVE, ANTEPARTUM: ICD-10-CM

## 2024-08-08 DIAGNOSIS — O26.899 RH NEGATIVE, ANTEPARTUM: ICD-10-CM

## 2024-08-08 LAB
EXTERNAL GROUP B STREP ANTIGEN: NEGATIVE
SL AMB  POCT GLUCOSE, UA: NORMAL
SL AMB POCT URINE PROTEIN: NORMAL

## 2024-08-08 PROCEDURE — PNV: Performed by: PHYSICIAN ASSISTANT

## 2024-08-08 PROCEDURE — 81002 URINALYSIS NONAUTO W/O SCOPE: CPT | Performed by: PHYSICIAN ASSISTANT

## 2024-08-08 NOTE — PROGRESS NOTES
"Routine Prenatal Visit  [facility] - [address], [address], [place] [address]    Assessment/Plan:  Fabby is a 28 y.o. year old  at 36w0d who presents for routine prenatal visit.     1. 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 36 weeks gestation of pregnancy  Assessment & Plan:  GBS done today.   Delivery consent reviewed and signed. Risks of delivery reviewed, including bleeding, infection, damage to surrounding organs/structures (specifically bowel, bladder, vessels, nerves, ureters), need for operative vaginal delivery or  delivery, hysterectomy, need for blood transfusion, need for further surgery.   Reviewed s/s of labor, how and when to call.   Return to office for ob check in 1 week.   Orders:  -     POCT urine dip  3.  screening for streptococcus B  -     Strep B DNA probe, amplification  4. Rh negative, antepartum  5. Rubella non-immune status, antepartum      Next OB Visit 1 week.     Subjective:     CC: Prenatal care    Fabby Tatum is a 28 y.o.  female who presents for routine prenatal care at 36w0d.  Pregnancy ROS: Good FM, No N/V, HA, cramping, VB, LOF, edema, domestic violence, or smoking. Tolerating PNV.      The following portions of the patient's history were reviewed and updated as appropriate: allergies, current medications, past family history, past medical history, obstetric history, gynecologic history, past social history, past surgical history and problem list.      Objective:  /78 (BP Location: Right arm, Patient Position: Sitting, Cuff Size: Standard)   Ht 5' 3.5\" (1.613 m)   Wt 75.8 kg (167 lb)   LMP 2023   BMI 29.12 kg/m²   Pregravid Weight/BMI: 65.8 kg (145 lb) (BMI 25.28)  Current Weight: 75.8 kg (167 lb)   Total Weight Gain: 9.979 kg (22 lb)   Pre- Vitals      Flowsheet Row Most Recent Value   Prenatal Assessment    Fetal Heart Rate 138   Fundal Height (cm) 36 cm "   Movement Present   Presentation Vertex   Prenatal Vitals    Blood Pressure 120/78   Weight - Scale 75.8 kg (167 lb)   Urine Albumin/Glucose    Dilation/Effacement/Station    Cervical Dilation 0   Cervical Effacement 0   Fetal Station -3   Vaginal Drainage    Edema    LLE Edema None   RLE Edema None   Facial Edema None             General: Well appearing, no distress  Abdomen: Soft, gravid, nontender  Fundal Height: Appropriate for gestational age.  Extremities: Warm and well perfused.  Non tender.

## 2024-08-08 NOTE — ASSESSMENT & PLAN NOTE
GBS done today.   Delivery consent reviewed and signed. Risks of delivery reviewed, including bleeding, infection, damage to surrounding organs/structures (specifically bowel, bladder, vessels, nerves, ureters), need for operative vaginal delivery or  delivery, hysterectomy, need for blood transfusion, need for further surgery.   Reviewed s/s of labor, how and when to call.   Return to office for ob check in 1 week.

## 2024-08-10 LAB — GP B STREP DNA SPEC QL NAA+PROBE: NEGATIVE

## 2024-08-12 NOTE — TELEPHONE ENCOUNTER
Patient called the RX Refill Line. Message is being forwarded to the office.     Patient is requesting a prescription for Breast Pump please be Please fax to:  [employer] [facility] Fax# [phone].  Patient Filled out the Forms online and just needs the prescription sent into [employer].     Please contact patient at [phone] with any questions

## 2024-08-13 NOTE — TELEPHONE ENCOUNTER
Breast pump rx was faxed to number provided by patient in previous message earlier this morning. Will keep an eye for incoming [employer] Fax and will resend once received.

## 2024-08-13 NOTE — TELEPHONE ENCOUNTER
Patient called the RX refill line. Patient states that [employer] was resending the form to the office. They received the form but need a prescription sent for it with the form.

## 2024-08-15 ENCOUNTER — ROUTINE PRENATAL (OUTPATIENT)
Dept: OBGYN CLINIC | Facility: CLINIC | Age: 29
End: 2024-08-15
Payer: COMMERCIAL

## 2024-08-15 VITALS
HEIGHT: 64 IN | SYSTOLIC BLOOD PRESSURE: 110 MMHG | WEIGHT: 166.4 LBS | DIASTOLIC BLOOD PRESSURE: 72 MMHG | BODY MASS INDEX: 28.41 KG/M2

## 2024-08-15 DIAGNOSIS — O26.899 RH NEGATIVE, ANTEPARTUM: ICD-10-CM

## 2024-08-15 DIAGNOSIS — O09.899 RUBELLA NON-IMMUNE STATUS, ANTEPARTUM: ICD-10-CM

## 2024-08-15 DIAGNOSIS — Z3A.37 37 WEEKS GESTATION OF PREGNANCY: ICD-10-CM

## 2024-08-15 DIAGNOSIS — Z67.91 RH NEGATIVE, ANTEPARTUM: ICD-10-CM

## 2024-08-15 DIAGNOSIS — Z28.39 RUBELLA NON-IMMUNE STATUS, ANTEPARTUM: ICD-10-CM

## 2024-08-15 DIAGNOSIS — O36.1930 KELL ISOIMMUNIZATION DURING PREGNANCY IN THIRD TRIMESTER, SINGLE OR UNSPECIFIED FETUS: Primary | ICD-10-CM

## 2024-08-15 LAB
SL AMB  POCT GLUCOSE, UA: NORMAL
SL AMB POCT URINE PROTEIN: NORMAL

## 2024-08-15 PROCEDURE — 81002 URINALYSIS NONAUTO W/O SCOPE: CPT | Performed by: OBSTETRICS & GYNECOLOGY

## 2024-08-15 PROCEDURE — PNV: Performed by: OBSTETRICS & GYNECOLOGY

## 2024-08-15 NOTE — PROGRESS NOTES
"Routine Prenatal Visit  [facility] [place]  [address] [place] [address]    Assessment/Plan:  Fabby is a 28 y.o. year old  at 37w0d who presents for routine prenatal visit.     1. 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 Rh negative, antepartum  3. Rubella non-immune status, antepartum  4. 37 weeks gestation of pregnancy  Assessment & Plan:  GBS negative. No S/S labor.   Orders:  -     POCT urine dip        Subjective:     CC: Prenatal care    Fabby Tatum is a 28 y.o.  female who presents for routine prenatal care at 37w0d.  Pregnancy ROS: no leakage of fluid, pelvic pain, or vaginal bleeding.  normal fetal movement.    The following portions of the patient's history were reviewed and updated as appropriate: allergies, current medications, past family history, past medical history, obstetric history, gynecologic history, past social history, past surgical history and problem list.      Objective:  /72 (BP Location: Left arm, Patient Position: Sitting, Cuff Size: Standard)   Ht 5' 3.5\" (1.613 m)   Wt 75.5 kg (166 lb 6.4 oz)   LMP 2023   BMI 29.01 kg/m²   Pregravid Weight/BMI: 65.8 kg (145 lb) (BMI 25.28)  Current Weight: 75.5 kg (166 lb 6.4 oz)   Total Weight Gain: 9.707 kg (21 lb 6.4 oz)   Pre-Shun Vitals      Flowsheet Row Most Recent Value   Prenatal Assessment    Fetal Heart Rate 135   Fundal Height (cm) 37 cm   Movement Present   Presentation Vertex   Prenatal Vitals    Blood Pressure 110/72   Weight - Scale 75.5 kg (166 lb 6.4 oz)   Urine Albumin/Glucose    Dilation/Effacement/Station    Vaginal Drainage    Edema              General: Well appearing, no distress  Respiratory: Unlabored breathing  Cardiovascular: Regular rate.  Abdomen: Soft, gravid, nontender  Fundal Height: Appropriate for gestational age.  Extremities: Warm and well perfused.  Non tender.  "

## 2024-08-21 ENCOUNTER — ROUTINE PRENATAL (OUTPATIENT)
Dept: OBGYN CLINIC | Facility: CLINIC | Age: 29
End: 2024-08-21
Payer: COMMERCIAL

## 2024-08-21 VITALS
SYSTOLIC BLOOD PRESSURE: 112 MMHG | WEIGHT: 169 LBS | HEIGHT: 64 IN | BODY MASS INDEX: 28.85 KG/M2 | DIASTOLIC BLOOD PRESSURE: 78 MMHG

## 2024-08-21 DIAGNOSIS — O36.1930 KELL ISOIMMUNIZATION DURING PREGNANCY IN THIRD TRIMESTER, SINGLE OR UNSPECIFIED FETUS: Primary | ICD-10-CM

## 2024-08-21 DIAGNOSIS — Z28.39 RUBELLA NON-IMMUNE STATUS, ANTEPARTUM: ICD-10-CM

## 2024-08-21 DIAGNOSIS — O09.899 RUBELLA NON-IMMUNE STATUS, ANTEPARTUM: ICD-10-CM

## 2024-08-21 DIAGNOSIS — Z3A.37 37 WEEKS GESTATION OF PREGNANCY: ICD-10-CM

## 2024-08-21 DIAGNOSIS — O26.899 RH NEGATIVE, ANTEPARTUM: ICD-10-CM

## 2024-08-21 DIAGNOSIS — Z67.91 RH NEGATIVE, ANTEPARTUM: ICD-10-CM

## 2024-08-21 LAB
SL AMB  POCT GLUCOSE, UA: NORMAL
SL AMB POCT URINE PROTEIN: NORMAL

## 2024-08-21 PROCEDURE — 81002 URINALYSIS NONAUTO W/O SCOPE: CPT | Performed by: STUDENT IN AN ORGANIZED HEALTH CARE EDUCATION/TRAINING PROGRAM

## 2024-08-21 PROCEDURE — PNV: Performed by: STUDENT IN AN ORGANIZED HEALTH CARE EDUCATION/TRAINING PROGRAM

## 2024-08-21 NOTE — PROGRESS NOTES
"Routine Prenatal Visit  [facility] - [address], [address], [place] [address]    Assessment/Plan:  Fabby is a 28 y.o. year old  at 37w6d who presents for routine prenatal visit.     1. 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 No further testing indicated.   2. Rh negative, antepartum  3. Rubella non-immune status, antepartum  4. 37 weeks gestation of pregnancy  Assessment & Plan:  - Labor/Bleeding/ROM precautions given. Kick counts reviewed.  - GBS negative result reviewed.   - Reviewed timing of delivery, including option for elective induction of labor as early as 39 weeks versus awaiting spontaneous onset of labor. Will plan for assessment of cervical dilation at 39 weeks.   - Problem list updated, results console reviewed and updated with pertinent prenatal labs.  - PMH, PSH, medications reviewed and updated as needed  - Return to office in 1 wk(s) for routine prenatal care    Orders:  -     POCT urine dip        Subjective:   Fabby Tatum is a 28 y.o.  who presents for routine prenatal care at 37w6d.  Complaints today: None  LOF: No; VB: No; Contractions: No; FM: Present and normal    Objective:  /78 (BP Location: Left arm, Patient Position: Sitting, Cuff Size: Standard)   Ht 5' 3.5\" (1.613 m)   Wt 76.7 kg (169 lb)   LMP 2023   BMI 29.47 kg/m²     General: Well appearing, no distress  Respiratory: Unlabored breathing  Cardiovascular: Regular rate.  Abdomen: Soft, gravid, nontender  Extremities: Warm and well perfused.  Non tender.    Pregravid Weight/BMI: 65.8 kg (145 lb) (BMI 25.28)  Current Weight: 76.7 kg (169 lb)   Total Weight Gain: 10.9 kg (24 lb)     Pre- Vitals      Flowsheet Row Most Recent Value   Prenatal Assessment    Fetal Heart Rate 135   Fundal Height (cm) 37 cm   Movement Present   Presentation Vertex   Prenatal Vitals    Blood Pressure 112/78   Weight - " Scale 76.7 kg (169 lb)   Urine Albumin/Glucose    Dilation/Effacement/Station    Vaginal Drainage    Draining Fluid No   Edema    LLE Edema None   RLE Edema None   Facial Edema None             Isaias Alvarado MD  8/21/2024 10:46 AM

## 2024-08-21 NOTE — ASSESSMENT & PLAN NOTE
- Labor/Bleeding/ROM precautions given. Kick counts reviewed.  - GBS negative result reviewed.   - Reviewed timing of delivery, including option for elective induction of labor as early as 39 weeks versus awaiting spontaneous onset of labor. Will plan for assessment of cervical dilation at 39 weeks.   - Problem list updated, results console reviewed and updated with pertinent prenatal labs.  - PMH, PSH, medications reviewed and updated as needed  - Return to office in 1 wk(s) for routine prenatal care

## 2024-08-28 ENCOUNTER — ROUTINE PRENATAL (OUTPATIENT)
Dept: OBGYN CLINIC | Facility: CLINIC | Age: 29
End: 2024-08-28
Payer: COMMERCIAL

## 2024-08-28 VITALS
BODY MASS INDEX: 28.17 KG/M2 | SYSTOLIC BLOOD PRESSURE: 112 MMHG | WEIGHT: 165 LBS | DIASTOLIC BLOOD PRESSURE: 74 MMHG | HEIGHT: 64 IN

## 2024-08-28 DIAGNOSIS — Z3A.38 38 WEEKS GESTATION OF PREGNANCY: ICD-10-CM

## 2024-08-28 DIAGNOSIS — O36.1930 KELL ISOIMMUNIZATION DURING PREGNANCY IN THIRD TRIMESTER, SINGLE OR UNSPECIFIED FETUS: Primary | ICD-10-CM

## 2024-08-28 DIAGNOSIS — Z67.91 RH NEGATIVE, ANTEPARTUM: ICD-10-CM

## 2024-08-28 DIAGNOSIS — Z28.39 RUBELLA NON-IMMUNE STATUS, ANTEPARTUM: ICD-10-CM

## 2024-08-28 DIAGNOSIS — O26.899 RH NEGATIVE, ANTEPARTUM: ICD-10-CM

## 2024-08-28 DIAGNOSIS — O09.899 RUBELLA NON-IMMUNE STATUS, ANTEPARTUM: ICD-10-CM

## 2024-08-28 LAB
SL AMB  POCT GLUCOSE, UA: NORMAL
SL AMB POCT URINE PROTEIN: NORMAL

## 2024-08-28 PROCEDURE — PNV: Performed by: STUDENT IN AN ORGANIZED HEALTH CARE EDUCATION/TRAINING PROGRAM

## 2024-08-28 PROCEDURE — 81002 URINALYSIS NONAUTO W/O SCOPE: CPT | Performed by: STUDENT IN AN ORGANIZED HEALTH CARE EDUCATION/TRAINING PROGRAM

## 2024-08-28 NOTE — PROGRESS NOTES
"Routine Prenatal Visit  [facility] - [address], [address], [place] [address]    Assessment/Plan:  Fabby is a 28 y.o. year old  at 38w6d who presents for routine prenatal visit.     1. 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 Rubella non-immune status, antepartum  3. Rh negative, antepartum  4. 38 weeks gestation of pregnancy  Assessment & Plan:  - Labor/Bleeding/ROM precautions given. Kick counts reviewed.  - GBS negative result reviewed.   - Reviewed timing of delivery, including option for elective induction of labor as early as 39 weeks versus awaiting spontaneous onset of labor. Patient desires elective induction. Her cervix is unfavorable. Per her request, she was scheduled for cervical ripening on  at 20:00. Confirmed with L&D registration.  - Problem list updated, results console reviewed and updated with pertinent prenatal labs.  - PMH, PSH, medications reviewed and updated as needed  - Return to office in 1 wk(s) for repeat cervical exam to see if ripening is still indicated.   Orders:  -     POCT urine dip        Subjective:   Fabby Tatum is a 28 y.o.  who presents for routine prenatal care at 38w6d.  Complaints today: No  LOF: No; VB: No; Contractions: No; FM: Present and normal    Objective:  /74 (BP Location: Left arm, Patient Position: Sitting, Cuff Size: Standard)   Ht 5' 3.5\" (1.613 m)   Wt 74.8 kg (165 lb)   LMP 2023   BMI 28.77 kg/m²     General: Well appearing, no distress  Respiratory: Unlabored breathing  Cardiovascular: Regular rate.  Abdomen: Soft, gravid, nontender  Extremities: Warm and well perfused.  Non tender.    Pregravid Weight/BMI: 65.8 kg (145 lb) (BMI 25.28)  Current Weight: 74.8 kg (165 lb)   Total Weight Gain: 9.072 kg (20 lb)     Pre-Shun Vitals      Flowsheet Row Most Recent Value   Prenatal Assessment    Fetal Heart Rate 125   Fundal Height (cm) 39 cm   Movement Present "   Presentation Vertex   Prenatal Vitals    Blood Pressure 112/74   Weight - Scale 74.8 kg (165 lb)   Urine Albumin/Glucose    Dilation/Effacement/Station    Cervical Dilation 1   Cervical Effacement 50   Fetal Station -2   Vaginal Drainage    Draining Fluid No   Edema    LLE Edema None   RLE Edema None   Facial Edema None             Isaias Alvarado MD  8/28/2024 2:42 PM

## 2024-08-28 NOTE — ASSESSMENT & PLAN NOTE
- Labor/Bleeding/ROM precautions given. Kick counts reviewed.  - GBS negative result reviewed.   - Reviewed timing of delivery, including option for elective induction of labor as early as 39 weeks versus awaiting spontaneous onset of labor. Patient desires elective induction. Her cervix is unfavorable. Per her request, she was scheduled for cervical ripening on 9/4 at 20:00. Confirmed with L&D registration.  - Problem list updated, results console reviewed and updated with pertinent prenatal labs.  - PMH, PSH, medications reviewed and updated as needed  - Return to office in 1 wk(s) for repeat cervical exam to see if ripening is still indicated.

## 2024-09-02 ENCOUNTER — ANESTHESIA (INPATIENT)
Dept: ANESTHESIOLOGY | Facility: HOSPITAL | Age: 29
End: 2024-09-02
Payer: COMMERCIAL

## 2024-09-02 ENCOUNTER — ANESTHESIA EVENT (INPATIENT)
Dept: ANESTHESIOLOGY | Facility: HOSPITAL | Age: 29
End: 2024-09-02
Payer: COMMERCIAL

## 2024-09-02 ENCOUNTER — NURSE TRIAGE (OUTPATIENT)
Dept: OTHER | Facility: OTHER | Age: 29
End: 2024-09-02

## 2024-09-02 ENCOUNTER — HOSPITAL ENCOUNTER (INPATIENT)
Facility: HOSPITAL | Age: 29
LOS: 1 days | Discharge: HOME | End: 2024-09-03
Attending: STUDENT IN AN ORGANIZED HEALTH CARE EDUCATION/TRAINING PROGRAM | Admitting: STUDENT IN AN ORGANIZED HEALTH CARE EDUCATION/TRAINING PROGRAM
Payer: COMMERCIAL

## 2024-09-02 PROBLEM — Z3A.39 39 WEEKS GESTATION OF PREGNANCY: Status: ACTIVE | Noted: 2024-04-25

## 2024-09-02 LAB
ABO GROUP BLD: NORMAL
ABO GROUP BLD: NORMAL
BASE EXCESS BLDCOV CALC-SCNC: -4.6 MMOL/L (ref 1–9)
BLD GP AB SCN SERPL QL: POSITIVE
BLD GP AB SCN SERPL QL: POSITIVE
BLOOD GROUP ANTIBODIES SERPL: NORMAL
BLOOD GROUP ANTIBODIES SERPL: NORMAL
ERYTHROCYTE [DISTWIDTH] IN BLOOD BY AUTOMATED COUNT: 14.8 % (ref 11.6–15.1)
FETAL CELL SCN BLD QL ROSETTE: NEGATIVE
HCO3 BLDCOV-SCNC: 19.1 MMOL/L (ref 12.2–28.6)
HCT VFR BLD AUTO: 38.1 % (ref 34.8–46.1)
HGB BLD-MCNC: 12 G/DL (ref 11.5–15.4)
HOLD SPECIMEN: NORMAL
MCH RBC QN AUTO: 25.7 PG (ref 26.8–34.3)
MCHC RBC AUTO-ENTMCNC: 31.5 G/DL (ref 31.4–37.4)
MCV RBC AUTO: 82 FL (ref 82–98)
OXYHGB MFR BLDCOV: 49.6 %
PCO2 BLDCOV: 32.1 MM HG (ref 27–43)
PH BLDCOV: 7.39 [PH] (ref 7.19–7.49)
PLATELET # BLD AUTO: 245 THOUSANDS/UL (ref 149–390)
PMV BLD AUTO: 10.2 FL (ref 8.9–12.7)
PO2 BLDCOV: 22.6 MM HG (ref 15–45)
RBC # BLD AUTO: 4.67 MILLION/UL (ref 3.81–5.12)
RH BLD: NEGATIVE
RH BLD: NEGATIVE
SAO2 % BLDCOV: 11.2 ML/DL
SPECIMEN EXPIRATION DATE: NORMAL
WBC # BLD AUTO: 12.95 THOUSAND/UL (ref 4.31–10.16)

## 2024-09-02 PROCEDURE — 86850 RBC ANTIBODY SCREEN: CPT | Performed by: OBSTETRICS & GYNECOLOGY

## 2024-09-02 PROCEDURE — 59400 OBSTETRICAL CARE: CPT | Performed by: OBSTETRICS & GYNECOLOGY

## 2024-09-02 PROCEDURE — 10907ZC DRAINAGE OF AMNIOTIC FLUID, THERAPEUTIC FROM PRODUCTS OF CONCEPTION, VIA NATURAL OR ARTIFICIAL OPENING: ICD-10-PCS | Performed by: OBSTETRICS & GYNECOLOGY

## 2024-09-02 PROCEDURE — 86900 BLOOD TYPING SEROLOGIC ABO: CPT | Performed by: OBSTETRICS & GYNECOLOGY

## 2024-09-02 PROCEDURE — 85027 COMPLETE CBC AUTOMATED: CPT | Performed by: OBSTETRICS & GYNECOLOGY

## 2024-09-02 PROCEDURE — 86905 BLOOD TYPING RBC ANTIGENS: CPT

## 2024-09-02 PROCEDURE — 86901 BLOOD TYPING SEROLOGIC RH(D): CPT | Performed by: OBSTETRICS & GYNECOLOGY

## 2024-09-02 PROCEDURE — NC001 PR NO CHARGE: Performed by: OBSTETRICS & GYNECOLOGY

## 2024-09-02 PROCEDURE — 99202 OFFICE O/P NEW SF 15 MIN: CPT

## 2024-09-02 PROCEDURE — 0KQM0ZZ REPAIR PERINEUM MUSCLE, OPEN APPROACH: ICD-10-PCS | Performed by: OBSTETRICS & GYNECOLOGY

## 2024-09-02 PROCEDURE — 86780 TREPONEMA PALLIDUM: CPT | Performed by: OBSTETRICS & GYNECOLOGY

## 2024-09-02 PROCEDURE — 88307 TISSUE EXAM BY PATHOLOGIST: CPT | Performed by: PATHOLOGY

## 2024-09-02 PROCEDURE — 82805 BLOOD GASES W/O2 SATURATION: CPT | Performed by: OBSTETRICS & GYNECOLOGY

## 2024-09-02 PROCEDURE — 86870 RBC ANTIBODY IDENTIFICATION: CPT | Performed by: OBSTETRICS & GYNECOLOGY

## 2024-09-02 PROCEDURE — 85461 HEMOGLOBIN FETAL: CPT | Performed by: OBSTETRICS & GYNECOLOGY

## 2024-09-02 PROCEDURE — 4A1HXCZ MONITORING OF PRODUCTS OF CONCEPTION, CARDIAC RATE, EXTERNAL APPROACH: ICD-10-PCS | Performed by: OBSTETRICS & GYNECOLOGY

## 2024-09-02 RX ORDER — BUPIVACAINE HYDROCHLORIDE 2.5 MG/ML
30 INJECTION, SOLUTION EPIDURAL; INFILTRATION; INTRACAUDAL ONCE AS NEEDED
Status: DISCONTINUED | OUTPATIENT
Start: 2024-09-02 | End: 2024-09-02

## 2024-09-02 RX ORDER — OXYTOCIN/RINGER'S LACTATE 30/500 ML
PLASTIC BAG, INJECTION (ML) INTRAVENOUS
Status: COMPLETED
Start: 2024-09-02 | End: 2024-09-02

## 2024-09-02 RX ORDER — SODIUM CHLORIDE, SODIUM LACTATE, POTASSIUM CHLORIDE, CALCIUM CHLORIDE 600; 310; 30; 20 MG/100ML; MG/100ML; MG/100ML; MG/100ML
125 INJECTION, SOLUTION INTRAVENOUS CONTINUOUS
Status: DISCONTINUED | OUTPATIENT
Start: 2024-09-02 | End: 2024-09-02

## 2024-09-02 RX ORDER — ROPIVACAINE HYDROCHLORIDE 2 MG/ML
INJECTION, SOLUTION EPIDURAL; INFILTRATION; PERINEURAL CONTINUOUS PRN
Status: DISCONTINUED | OUTPATIENT
Start: 2024-09-02 | End: 2024-09-02 | Stop reason: HOSPADM

## 2024-09-02 RX ORDER — ONDANSETRON 2 MG/ML
4 INJECTION INTRAMUSCULAR; INTRAVENOUS EVERY 6 HOURS PRN
Status: DISCONTINUED | OUTPATIENT
Start: 2024-09-02 | End: 2024-09-02

## 2024-09-02 RX ORDER — BENZOCAINE/MENTHOL 6 MG-10 MG
1 LOZENGE MUCOUS MEMBRANE DAILY PRN
Status: DISCONTINUED | OUTPATIENT
Start: 2024-09-02 | End: 2024-09-03 | Stop reason: HOSPADM

## 2024-09-02 RX ORDER — OXYTOCIN/RINGER'S LACTATE 30/500 ML
250 PLASTIC BAG, INJECTION (ML) INTRAVENOUS ONCE
Status: COMPLETED | OUTPATIENT
Start: 2024-09-02 | End: 2024-09-02

## 2024-09-02 RX ORDER — CALCIUM CARBONATE 500 MG/1
1000 TABLET, CHEWABLE ORAL DAILY PRN
Status: DISCONTINUED | OUTPATIENT
Start: 2024-09-02 | End: 2024-09-03 | Stop reason: HOSPADM

## 2024-09-02 RX ORDER — BUPIVACAINE HYDROCHLORIDE 2.5 MG/ML
INJECTION, SOLUTION EPIDURAL; INFILTRATION; INTRACAUDAL AS NEEDED
Status: DISCONTINUED | OUTPATIENT
Start: 2024-09-02 | End: 2024-09-02 | Stop reason: HOSPADM

## 2024-09-02 RX ORDER — IBUPROFEN 600 MG/1
600 TABLET, FILM COATED ORAL EVERY 6 HOURS PRN
Status: DISCONTINUED | OUTPATIENT
Start: 2024-09-02 | End: 2024-09-03 | Stop reason: HOSPADM

## 2024-09-02 RX ORDER — ONDANSETRON 2 MG/ML
4 INJECTION INTRAMUSCULAR; INTRAVENOUS EVERY 8 HOURS PRN
Status: DISCONTINUED | OUTPATIENT
Start: 2024-09-02 | End: 2024-09-03 | Stop reason: HOSPADM

## 2024-09-02 RX ORDER — LIDOCAINE HYDROCHLORIDE AND EPINEPHRINE 15; 5 MG/ML; UG/ML
INJECTION, SOLUTION EPIDURAL AS NEEDED
Status: DISCONTINUED | OUTPATIENT
Start: 2024-09-02 | End: 2024-09-02 | Stop reason: HOSPADM

## 2024-09-02 RX ORDER — ACETAMINOPHEN 325 MG/1
650 TABLET ORAL EVERY 4 HOURS PRN
Status: DISCONTINUED | OUTPATIENT
Start: 2024-09-02 | End: 2024-09-03 | Stop reason: HOSPADM

## 2024-09-02 RX ORDER — DOCUSATE SODIUM 100 MG/1
100 CAPSULE, LIQUID FILLED ORAL 2 TIMES DAILY
Status: DISCONTINUED | OUTPATIENT
Start: 2024-09-02 | End: 2024-09-03 | Stop reason: HOSPADM

## 2024-09-02 RX ORDER — LIDOCAINE HYDROCHLORIDE 10 MG/ML
INJECTION, SOLUTION EPIDURAL; INFILTRATION; INTRACAUDAL; PERINEURAL AS NEEDED
Status: DISCONTINUED | OUTPATIENT
Start: 2024-09-02 | End: 2024-09-02 | Stop reason: HOSPADM

## 2024-09-02 RX ADMIN — ROPIVACAINE HYDROCHLORIDE: 2 INJECTION, SOLUTION EPIDURAL; INFILTRATION at 07:48

## 2024-09-02 RX ADMIN — DOCUSATE SODIUM 100 MG: 100 CAPSULE, LIQUID FILLED ORAL at 17:29

## 2024-09-02 RX ADMIN — SODIUM CHLORIDE, SODIUM LACTATE, POTASSIUM CHLORIDE, AND CALCIUM CHLORIDE 1000 ML: .6; .31; .03; .02 INJECTION, SOLUTION INTRAVENOUS at 05:34

## 2024-09-02 RX ADMIN — BUPIVACAINE HYDROCHLORIDE 5 ML: 2.5 INJECTION, SOLUTION EPIDURAL; INFILTRATION; INTRACAUDAL; PERINEURAL at 07:38

## 2024-09-02 RX ADMIN — Medication 250 UNITS: at 13:58

## 2024-09-02 RX ADMIN — LIDOCAINE HYDROCHLORIDE,EPINEPHRINE BITARTRATE 3 ML: 15; .005 INJECTION, SOLUTION EPIDURAL; INFILTRATION; INTRACAUDAL; PERINEURAL at 07:40

## 2024-09-02 RX ADMIN — ROPIVACAINE HYDROCHLORIDE 12 ML/HR: 2 INJECTION, SOLUTION EPIDURAL; INFILTRATION at 07:49

## 2024-09-02 RX ADMIN — ACETAMINOPHEN 650 MG: 325 TABLET ORAL at 20:02

## 2024-09-02 RX ADMIN — IBUPROFEN 600 MG: 600 TABLET, FILM COATED ORAL at 23:43

## 2024-09-02 RX ADMIN — LIDOCAINE HYDROCHLORIDE 3 ML: 10 INJECTION, SOLUTION EPIDURAL; INFILTRATION; INTRACAUDAL; PERINEURAL at 07:34

## 2024-09-02 RX ADMIN — WITCH HAZEL 1 PAD: 500 SOLUTION RECTAL; TOPICAL at 15:05

## 2024-09-02 RX ADMIN — BENZOCAINE AND LEVOMENTHOL 1 APPLICATION: 200; 5 SPRAY TOPICAL at 15:05

## 2024-09-02 RX ADMIN — BUPIVACAINE HYDROCHLORIDE 3 ML: 2.5 INJECTION, SOLUTION EPIDURAL; INFILTRATION; INTRACAUDAL; PERINEURAL at 07:45

## 2024-09-02 RX ADMIN — SODIUM CHLORIDE, SODIUM LACTATE, POTASSIUM CHLORIDE, AND CALCIUM CHLORIDE 999 ML/HR: .6; .31; .03; .02 INJECTION, SOLUTION INTRAVENOUS at 07:47

## 2024-09-02 RX ADMIN — IBUPROFEN 600 MG: 600 TABLET, FILM COATED ORAL at 15:05

## 2024-09-02 NOTE — OB LABOR/OXYTOCIN SAFETY PROGRESS
Labor Progress Note - Fabby Tatum 28 y.o. female MRN: [id number]    Unit/Bed#: -01 Encounter: [id number]       Contraction Frequency (minutes): 2-4  Contraction Intensity: Moderate  Uterine Activity Characteristics: Regular  Cervical Dilation: 10  Dilation Complete Date: 09/02/24  Dilation Complete Time: 1305  Cervical Effacement: 100  Fetal Station: 2  Baseline Rate (FHR): 125 bpm  Fetal Heart Rate (FHT): 130 BPM  FHR Category: 2 due to occasional variable decelerations. There are accelerations and moderate variability is present               Vital Signs:   Vitals:    09/02/24 1245   BP: 101/55   Pulse: 76   Resp:    Temp:        Notes/comments:   Feeling some pressure. She is complete and +2 station. Will begin pushing. Anticipate JANIE Paulino MD 9/2/2024 1:08 PM

## 2024-09-02 NOTE — ANESTHESIA POSTPROCEDURE EVALUATION
Post-Op Assessment Note    CV Status:  Stable  Pain Score: 0    Pain management: adequate      Post-op block assessment: site cleaned, catheter intact and no complications   Mental Status:  Alert and awake   Hydration Status:  Euvolemic and stable   PONV Controlled:  None   Airway Patency:  Patent     Post Op Vitals Reviewed: Yes    No anethesia notable event occurred.    Staff: Anesthesiologist               BP      Temp      Pulse     Resp      SpO2

## 2024-09-02 NOTE — PLAN OF CARE
Problem: PAIN - ADULT  Goal: Verbalizes/displays adequate comfort level or baseline comfort level  Description: Interventions:  - Encourage patient to monitor pain and request assistance  - Assess pain using appropriate pain scale  - Administer analgesics based on type and severity of pain and evaluate response  - Implement non-pharmacological measures as appropriate and evaluate response  - Consider cultural and social influences on pain and pain management  - Notify physician/advanced practitioner if interventions unsuccessful or patient reports new pain  Outcome: Progressing     Problem: INFECTION - ADULT  Goal: Absence or prevention of progression during hospitalization  Description: INTERVENTIONS:  - Assess and monitor for signs and symptoms of infection  - Monitor lab/diagnostic results  - Monitor all insertion sites, i.e. indwelling lines, tubes, and drains  - Monitor endotracheal if appropriate and nasal secretions for changes in amount and color  - [place] appropriate cooling/warming therapies per order  - Administer medications as ordered  - Instruct and encourage patient and family to use good hand hygiene technique  - Identify and instruct in appropriate isolation precautions for identified infection/condition  Outcome: Progressing  Goal: Absence of fever/infection during neutropenic period  Description: INTERVENTIONS:  - Monitor WBC    Outcome: Progressing     Problem: SAFETY ADULT  Goal: Patient will remain free of falls  Description: INTERVENTIONS:  - Educate patient/family on patient safety including physical limitations  - Instruct patient to call for assistance with activity   - Consult OT/PT to assist with strengthening/mobility   - Keep Call bell within reach  - Keep bed low and locked with side rails adjusted as appropriate  - Keep care items and personal belongings within reach  - Initiate and maintain comfort rounds  - Make Fall Risk Sign visible to staff    - Apply yellow socks and  bracelet for high fall risk patients  - Consider moving patient to room near nurses station  Outcome: Progressing  Goal: Maintain or return to baseline ADL function  Description: INTERVENTIONS:  -  Assess patient's ability to carry out ADLs; assess patient's baseline for ADL function and identify physical deficits which impact ability to perform ADLs (bathing, care of mouth/teeth, toileting, grooming, dressing, etc.)  - Assess/evaluate cause of self-care deficits   - Assess range of motion  - Assess patient's mobility; develop plan if impaired  - Assess patient's need for assistive devices and provide as appropriate  - Encourage maximum independence but intervene and supervise when necessary  - Involve family in performance of ADLs  - Assess for home care needs following discharge   - Consider OT consult to assist with ADL evaluation and planning for discharge  - Provide patient education as appropriate  Outcome: Progressing  Goal: Maintains/Returns to pre admission functional level  Description: INTERVENTIONS:  - Perform AM-PAC 6 Click Basic Mobility/ Daily Activity assessment daily.  - Set and communicate daily mobility goal to care team and patient/family/caregiver.   - Collaborate with rehabilitation services on mobility goals if consulted    - Out of bed for toileting  - Record patient progress and toleration of activity level   Outcome: Progressing     Problem: DISCHARGE PLANNING  Goal: Discharge to home or other facility with appropriate resources  Description: INTERVENTIONS:  - Identify barriers to discharge w/patient and caregiver  - Arrange for needed discharge resources and transportation as appropriate  - Identify discharge learning needs (meds, wound care, etc.)  - Arrange for interpretive services to assist at discharge as needed  - Refer to Case Management Department for coordinating discharge planning if the patient needs post-hospital services based on physician/advanced practitioner order or  complex needs related to functional status, cognitive ability, or social support system  Outcome: Progressing     Problem: Knowledge Deficit  Goal: Patient/family/caregiver demonstrates understanding of disease process, treatment plan, medications, and discharge instructions  Description: Complete learning assessment and assess knowledge base.  Interventions:  - Provide teaching at level of understanding  - Provide teaching via preferred learning methods  Outcome: Progressing     Problem: BIRTH - VAGINAL/ SECTION  Goal: Fetal and maternal status remain reassuring during the birth process  Description: INTERVENTIONS:  - Monitor vital signs  - Monitor fetal heart rate  - Monitor uterine activity  - Monitor labor progression (vaginal delivery)  - DVT prophylaxis  - Antibiotic prophylaxis  Outcome: Progressing  Goal: Emotionally satisfying birthing experience for mother/fetus  Description: Interventions:  - Assess, plan, implement and evaluate the nursing care given to the patient in labor  - Advocate the philosophy that each childbirth experience is a unique experience and support the family's chosen level of involvement and control during the labor process   - Actively participate in both the patient's and family's teaching of the birth process  - Consider cultural, Christianity and age-specific factors and plan care for the patient in labor  Outcome: Progressing

## 2024-09-02 NOTE — TELEPHONE ENCOUNTER
"Reason for Disposition   [1] First baby (primipara) AND [2] contractions < 6 minutes apart  AND [3] present 2 hours    Answer Assessment - Initial Assessment Questions  1. ONSET: \"When did the symptoms begin?\"         Several hours ago   2. CONTRACTIONS: \"Describe the contractions that you are having.\" (e.g., duration, frequency, regularity, severity)      5 minutes apart for several hours   3. OMAR: \"What date are you expecting to deliver?\"      24  4. PARITY: \"Have you had a baby before?\" If Yes, ask: \"How long did the labor last?\"        5. FETAL MOVEMENT: \"Has the baby's movement decreased or changed significantly from normal?\"      Good fetal movement   6. OTHER SYMPTOMS: \"Do you have any other symptoms?\" (e.g., leaking fluid from vagina, vaginal bleeding, fever, hand/facial swelling)      Mucus plug came out today    Protocols used: Pregnancy - Labor-ADULT-AH    "

## 2024-09-02 NOTE — ANESTHESIA PREPROCEDURE EVALUATION
Procedure:  LABOR ANALGESIA    Relevant Problems   GYN   (+) 39 weeks gestation of pregnancy      Obstetrics/Gynecology   (+) Adeola isoimmunization during pregnancy      Blood   (+) Rh negative, antepartum        Physical Exam    Airway    Mallampati score: II  TM Distance: >3 FB  Neck ROM: full     Dental   No notable dental hx     Cardiovascular      Pulmonary      Other Findings  post-pubertal.      Anesthesia Plan  ASA Score- 2     Anesthesia Type- epidural with ASA Monitors.         Additional Monitors:     Airway Plan:            Plan Factors-    Chart reviewed.   Existing labs reviewed. Patient summary reviewed.    Patient is not a current smoker.              Induction-     Postoperative Plan-     Perioperative Resuscitation Plan - Level 1 - Full Code.       Informed Consent- Anesthetic plan and risks discussed with patient and spouse.

## 2024-09-02 NOTE — TELEPHONE ENCOUNTER
ESC placed to on call provider per protocol.    Ok to send patient to L&D triage per Dr. Alvarado.    [facility] OB Charge RN notified.

## 2024-09-02 NOTE — OB LABOR/OXYTOCIN SAFETY PROGRESS
Labor Progress Note - Fabby Tatum 28 y.o. female MRN: [id number]    Unit/Bed#: -01 Encounter: [id number]       Contraction Frequency (minutes): 2-6 (Difficult tracing due to maternal position)  Contraction Intensity: Mild/Moderate  Uterine Activity Characteristics: Regular  Cervical Dilation: 7        Cervical Effacement: 90  Fetal Station: -1  Baseline Rate (FHR): 130 bpm  Fetal Heart Rate (FHT): 130 BPM  FHR Category: 1               Vital Signs:   Vitals:    09/02/24 0943   BP: 110/68   Pulse: 63   Resp:    Temp:        Notes/comments:   Comfortable with epidural. AROM for clear fluid. First exam in active phase with appropriate progress. Anticipate SAVD.      Tyshawn Paulino MD 9/2/2024 10:07 AM

## 2024-09-02 NOTE — OB LABOR/OXYTOCIN SAFETY PROGRESS
Labor Progress Note - Fabby Tatum 28 y.o. female MRN: [id number]    Unit/Bed#: -01 Encounter: [id number]       Contraction Frequency (minutes): 4-7  Contraction Intensity: Mild/Moderate  Uterine Activity Characteristics: Regular  Cervical Dilation: 5        Cervical Effacement: 90  Fetal Station: -1  Baseline Rate (FHR): 135 bpm  Fetal Heart Rate (FHT): 130 BPM  FHR Category: 1               Vital Signs:   Vitals:    09/02/24 0843   BP: 94/52   Pulse: 68   Resp:    Temp:        Notes/comments:     Comfortable with epidural  Making good progress    Makenzie Patten DO 9/2/2024 8:56 AM

## 2024-09-02 NOTE — L&D DELIVERY NOTE
Vaginal Delivery Summary - OB/GYN   Fabby Tatum 28 y.o. female MRN: [id number]  Unit/Bed#: -01 Encounter: [id number]          Predelivery Diagnosis:  1. Pregnancy at 39w 4d     Postdelivery Diagnosis:  1. Same as above  2. Delivery of term male     Procedure: Spontaneous Vaginal Delivery, repair of second degree laceration    Attending: Jefferson    Anesthesia: Epidural    QBL: 189 ml  Admission H.0  Admission platelets: 245    Complications: none apparent    Specimens: cord blood, arterial and venous cord blood gases, placenta to pathology    Findings:   1. Viable male at 1354, with APGARS of 8 and 9 at 1 and 5 minutes respectively,  2. Spontaneous delivery of intact placenta at 1400  3. 2 degree laceration repaired with 3-0 vicryl  4. Blood gases:    Umbilical Cord Venous Blood Gas:  Results from last 7 days   Lab Units 24  1356   PH COV  7.393   PCO2 COV mm HG 32.1   HCO3 COV mmol/L 19.1   BASE EXC COV mmol/L -4.6*   O2 CT CD VB mL/dL 11.2   O2 HGB, VENOUS CORD % 49.6     Umbilical Cord Arterial Blood Gas:        Disposition:  Patient tolerated the procedure well and was recovering in labor and delivery room     Brief history and labor course:  Ms. Fabby Tatum is a 28 y.o.  at 39wk4d. She presented to labor and delivery in early labor. She progressed spontaneously, received an epidural for pain management and had AROM performed at 1000. She she continued to make spontaneous progress and was found to be fully dilated @ 1305 with her first pushing efforts beginning @ 1310.    Description of procedure    After pushing for 44 minutes, at 1354 patient delivered a viable male , wt pending, apgars of 8 (1 min) and 9 (5 min). The fetal vertex delivered spontaneously. No nuchal cord was noted. The anterior shoulder delivered atraumatically with maternal expulsive forces and the assistance of downward traction. The posterior shoulder delivered with maternal expulsive forces and the  assistance of upward traction. The remainder of the fetus delivered spontaneously.     Upon delivery, the infant was placed on the maternal abdomen and after 60 seconds the umbilical cord was then doubly clamped and cut.  The infant was noted to cry spontaneously and was moving all extremities appropriately. Arterial and venous cord blood gases and cord blood was collected for analysis. These were promptly sent to the lab. In the immediate post-partum, 30 units of IV pitocin was administered, and the uterus was noted to contract down well with massage and pitocin. The placenta delivered spontaneously at 1400 and was noted to have a eccentrically inserted 3 vessel cord. This was sent to pathology as she has [place] antibodies. The baby was known to be Adeola negative.    The vagina, cervix, perineum, and rectum were inspected and there was noted to be a second degree laceration which was repaired in a layered fashion.    At the conclusion of the procedure, all needle, sponge, and instrument counts were noted to be correct. Patient tolerated the procedure well and was allowed to recover in labor and delivery room with family and  before being transferred to the post-partum floor.       Tyshawn Paulino MD  2024  2:31 PM

## 2024-09-02 NOTE — H&P
"Ob/Gyn History and Physical  Fabby Tatum 28 y.o. female MRN: [id number]  Unit/Bed#: LD TRIAGE 2- Encounter: [id number]    A/P. 28 y.o.  at 39w4d, in early/latent labor.  (1) Early/latent labor.  Painful contractions.  Cervical change from 2 to 3cm dilated.    Nulliparous, but adequate pelvis.  Cephalic, EFW 3200grams.    Discussed admission with augmentation if no further spontaneous change, and she is amenable to this.  --> Admit.  Labs/toco/IVF.  --> Will augment if no further cervical change.    (2) Anti-Rowe Ab.  Last T&S 1/15/24.  Anti-K (+).  Partner Rowe Ag negative.  Fetus not at risk.  --> Type and screen..    (3) Rh negative.  --> For RhoGAM postpartum.    (4) Rubella Non-Immune.  --> For MMR postpartum.    (5) Fetal status reassuring.  Reactive NST, category I FHRT.  --> Continuous fetal monitoring.    Maninder Noonan MD  24  -------------------------------------------------------------------------------------------------------  CC/    \"I am having contractions.\"    HPI/    Painful Ucs every 5 minutes x hours.  No VB, no LoF.  (+)FM.    1cm dilated in the office .  Planned admission for ripening 24.    Pregnancy complications/      Patient Active Problem List   Diagnosis    Rowe isoimmunization during pregnancy    Nonimmune to hepatitis B virus    Rh negative, antepartum    Rubella non-immune status, antepartum    39 weeks gestation of pregnancy    Lactating mother       Allergies/      Allergies as of 2024 - Reviewed 2024   Allergen Reaction Noted    Minocycline Hives 01/15/2024    Pollen extract Other (See Comments) 2023       Rx/      No current facility-administered medications on file prior to encounter.     Current Outpatient Medications on File Prior to Encounter   Medication Sig Dispense Refill    EPINEPHrine (EpiPen 2-Kevin) 0.3 mg/0.3 mL SOAJ As needed      Prenatal Vit-Iron Carbonyl-FA (prenatal multivitamin) TABS          PMH/      Past Medical " History:   Diagnosis Date    Adeola isoimmunization during pregnancy        PSH/    No past surgical history on file.    ObHx/    , expecting a baby boy named Harlan.      OB History    Para Term  AB Living   1 0 0 0 0 0   SAB IAB Ectopic Multiple Live Births   0 0 0 0 0      # Outcome Date GA Lbr Mason/2nd Weight Sex Type Anes PTL Lv   1 Current                GynHx/    There is no history of sexually-transmitted infections.    FH/    There is no family history of birth defects.        Family History   Problem Relation Age of Onset    Heart failure Mother     Heart attack Paternal Grandfather     Stroke Paternal Grandfather     Heart attack Paternal Grandmother     Thyroid disease Paternal Grandmother        SH/    She is .    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    She does not use tobacco, alcohol, or illicit drugs.        Social History     Socioeconomic History    Marital status: /Civil Union     Spouse name: Not on file    Number of children: Not on file    Years of education: Not on file    Highest education level: Not on file   Occupational History    Not on file   Tobacco Use    Smoking status: Never     Passive exposure: Never    Smokeless tobacco: Never   Vaping Use    Vaping status: Never Used   Substance and Sexual Activity    Alcohol use: Not Currently     Alcohol/week: 1.0 standard drink of alcohol     Types: 1 Glasses of wine per week    Drug use: Never    Sexual activity: Yes     Partners: Male     Birth control/protection: None   Other Topics Concern    Not on file   Social History Narrative    Not on file     Social Determinants of Health     Financial Resource Strain: Low Risk  (2023)    Received from [facility], [facility]    Overall Financial Resource Strain (CARDIA)     Difficulty of Paying Living Expenses: Not hard at all   Food Insecurity: No Food Insecurity (2024)    Hunger Vital Sign     Worried About Running Out  of Food in the Last Year: Never true     Ran Out of Food in the Last Year: Never true   Transportation Needs: No Transportation Needs (5/17/2024)    PRAPARE - Transportation     Lack of Transportation (Medical): No     Lack of Transportation (Non-Medical): No   Physical Activity: Not on file   Stress: No Stress Concern Present (4/12/2023)    [facility]    [employer] - Occupational Stress Questionnaire     Feeling of Stress : Only a little   Social Connections: Moderately Isolated (4/12/2023)    [facility] from [facility], [facility]    Social Connection and Isolation Panel [NHANES]     Frequency of Communication with Friends and Family: More than three times a week     Frequency of Social Gatherings with Friends and Family: More than three times a week     Attends Restorationist Services: Never     Active Member of Clubs or Organizations: No     Attends Club or Organization Meetings: Patient declined     Marital Status:    Intimate Partner Violence: Not At Risk (4/12/2023)    [facility], [facility]    Humiliation, Afraid, Rape, and Kick questionnaire     Fear of Current or Ex-Partner: No     Emotionally Abused: No     Physically Abused: No     Sexually Abused: No   Housing Stability: Low Risk  (5/17/2024)    Housing Stability Vital Sign     Unable to Pay for Housing in the Last Year: No     Number of Times Moved in the Last Year: 1     Homeless in the Last Year: No       RoS/    Constitutional: Negative    CV: Negative    Pulm: Negative    GI: Negative    Urinary: Negative    Neuro: Negative    Musculoskeletal: Negative    O/  /80   Pulse 90   Temp 98.6 °F (37 °C) (Temporal)   Resp 16   LMP 11/30/2023     Alert, comfortable, no acute distress    Regular rate and rhythm    Clear to auscultation bilaterally    Abdomen soft,  nontender, nondistended.    Fundus nontender, size consistent with dates      Cephalic      EFW 3200    Gyn/      Normal female external genitalia.      Vault well-estrogenized, well-supported.      Pelvis adequate/gynecoid.    Initial exam:      Cervix:           Dilation: 2cm         Effacement: 50%         Station: -3  Consistency: Soft         Position: Middle   Presentation: Vertex    Repeat exam:      Cervix:           Dilation: 3cm         Effacement: 70%         Station: -2  Consistency: Soft         Position: Middle   Presentation: Vertex    FHR: 130 moderate variability.  (+) accels, no decels.  Reactive.    [place]:  q 2-6 minutes    Ultrasound:  Cephalic    Prenatal labs/  Lab Results   Component Value Date    ABO A 06/13/2024    RH Negative 06/13/2024    ABS Abnormal 01/15/2024    HGB 12.4 06/13/2024     06/13/2024    EXTRUBELIGGQ nonimmune 01/15/2024    RPR Non Reactive 06/13/2024    HEPBSAG negative 01/15/2024    HIVAGAB Non-Reactive 01/15/2024    GC negative 01/15/2024    [id number] 101 06/13/2024       GBS:  Negative

## 2024-09-02 NOTE — OB LABOR/OXYTOCIN SAFETY PROGRESS
Labor Progress Note - Fabby Tatum 28 y.o. female MRN: [id number]    Unit/Bed#: -01 Encounter: [id number]       Contraction Frequency (minutes): 2-4  Contraction Intensity: Moderate  Uterine Activity Characteristics: Regular  Cervical Dilation: 9        Cervical Effacement: 100  Fetal Station: 0  Baseline Rate (FHR): 130 bpm  Fetal Heart Rate (FHT): 130 BPM  FHR Category: 1               Vital Signs:   Vitals:    09/02/24 1158   BP: 121/80   Pulse: 81   Resp:    Temp:        Notes/comments:   Comfortable with epidural. Making appropriate progress. Anticipate JANIE Paulino MD 9/2/2024 12:06 PM

## 2024-09-02 NOTE — PLAN OF CARE
Problem: PAIN - ADULT  Goal: Verbalizes/displays adequate comfort level or baseline comfort level  Description: Interventions:  - Encourage patient to monitor pain and request assistance  - Assess pain using appropriate pain scale  - Administer analgesics based on type and severity of pain and evaluate response  - Implement non-pharmacological measures as appropriate and evaluate response  - Consider cultural and social influences on pain and pain management  - Notify physician/advanced practitioner if interventions unsuccessful or patient reports new pain  Outcome: Progressing     Problem: INFECTION - ADULT  Goal: Absence or prevention of progression during hospitalization  Description: INTERVENTIONS:  - Assess and monitor for signs and symptoms of infection  - Monitor lab/diagnostic results  - Monitor all insertion sites, i.e. indwelling lines, tubes, and drains  - Monitor endotracheal if appropriate and nasal secretions for changes in amount and color  - [place] appropriate cooling/warming therapies per order  - Administer medications as ordered  - Instruct and encourage patient and family to use good hand hygiene technique  - Identify and instruct in appropriate isolation precautions for identified infection/condition  Outcome: Progressing  Goal: Absence of fever/infection during neutropenic period  Description: INTERVENTIONS:  - Monitor WBC    Outcome: Progressing     Problem: SAFETY ADULT  Goal: Patient will remain free of falls  Description: INTERVENTIONS:  - Educate patient/family on patient safety including physical limitations  - Instruct patient to call for assistance with activity   - Consult OT/PT to assist with strengthening/mobility   - Keep Call bell within reach  - Keep bed low and locked with side rails adjusted as appropriate  - Keep care items and personal belongings within reach  - Initiate and maintain comfort rounds  - Make Fall Risk Sign visible to staff    - Apply yellow socks and  bracelet for high fall risk patients  - Consider moving patient to room near nurses station  Outcome: Progressing  Goal: Maintain or return to baseline ADL function  Description: INTERVENTIONS:  -  Assess patient's ability to carry out ADLs; assess patient's baseline for ADL function and identify physical deficits which impact ability to perform ADLs (bathing, care of mouth/teeth, toileting, grooming, dressing, etc.)  - Assess/evaluate cause of self-care deficits   - Assess range of motion  - Assess patient's mobility; develop plan if impaired  - Assess patient's need for assistive devices and provide as appropriate  - Encourage maximum independence but intervene and supervise when necessary  - Involve family in performance of ADLs  - Assess for home care needs following discharge   - Consider OT consult to assist with ADL evaluation and planning for discharge  - Provide patient education as appropriate  Outcome: Progressing  Goal: Maintains/Returns to pre admission functional level  Description: INTERVENTIONS:  - Perform AM-PAC 6 Click Basic Mobility/ Daily Activity assessment daily.  - Set and communicate daily mobility goal to care team and patient/family/caregiver.   - Collaborate with rehabilitation services on mobility goals if consulted    - Out of bed for toileting  - Record patient progress and toleration of activity level   Outcome: Progressing     Problem: DISCHARGE PLANNING  Goal: Discharge to home or other facility with appropriate resources  Description: INTERVENTIONS:  - Identify barriers to discharge w/patient and caregiver  - Arrange for needed discharge resources and transportation as appropriate  - Identify discharge learning needs (meds, wound care, etc.)  - Arrange for interpretive services to assist at discharge as needed  - Refer to Case Management Department for coordinating discharge planning if the patient needs post-hospital services based on physician/advanced practitioner order or  complex needs related to functional status, cognitive ability, or social support system  Outcome: Progressing     Problem: Knowledge Deficit  Goal: Patient/family/caregiver demonstrates understanding of disease process, treatment plan, medications, and discharge instructions  Description: Complete learning assessment and assess knowledge base.  Interventions:  - Provide teaching at level of understanding  - Provide teaching via preferred learning methods  Outcome: Progressing     Problem: BIRTH - VAGINAL/ SECTION  Goal: Fetal and maternal status remain reassuring during the birth process  Description: INTERVENTIONS:  - Monitor vital signs  - Monitor fetal heart rate  - Monitor uterine activity  - Monitor labor progression (vaginal delivery)  - DVT prophylaxis  - Antibiotic prophylaxis  2024 by Francoise Gonzales RN  Outcome: Completed  2024 125 by Francoise Gonzales RN  Outcome: Progressing  Goal: Emotionally satisfying birthing experience for mother/fetus  Description: Interventions:  - Assess, plan, implement and evaluate the nursing care given to the patient in labor  - Advocate the philosophy that each childbirth experience is a unique experience and support the family's chosen level of involvement and control during the labor process   - Actively participate in both the patient's and family's teaching of the birth process  - Consider cultural, Scientology and age-specific factors and plan care for the patient in labor  2024 by Francoise Gonzales RN  Outcome: Completed  2024 125 by Francoise Gonzales RN  Outcome: Progressing

## 2024-09-02 NOTE — ANESTHESIA PROCEDURE NOTES
Epidural Block    Patient location during procedure: OB/L&D  Start time: 9/2/2024 7:39 AM  Reason for block: procedure for pain and at surgeon's request  Staffing  Performed by: Eliazar Ríos DO  Authorized by: Eliazar Ríos DO    Preanesthetic Checklist  Completed: patient identified, IV checked, site marked, risks and benefits discussed, surgical consent, monitors and equipment checked, pre-op evaluation and timeout performed  Epidural  Patient position: sitting  Prep: ChloraPrep  Sedation Level: no sedation  Patient monitoring: continuous pulse oximetry, frequent blood pressure checks and heart rate  Approach: midline  Location: lumbar, L3-4  Injection technique: MATT air  Needle  Needle type: Tuohy   Needle gauge: 17 G  Needle insertion depth: 4.5 cm  Catheter type: spring wound  Catheter size: 19 G  Catheter at skin depth: 9 cm  Catheter securement method: clear occlusive dressing, stabilization device and tape  Test dose: negative  Assessment  Sensory level: T10  Number of attempts: 1negative aspiration for CSF, negative aspiration for heme and no paresthesia on injection  patient tolerated the procedure well with no immediate complications

## 2024-09-02 NOTE — PLAN OF CARE
Problem: PAIN - ADULT  Goal: Verbalizes/displays adequate comfort level or baseline comfort level  Description: Interventions:  - Encourage patient to monitor pain and request assistance  - Assess pain using appropriate pain scale  - Administer analgesics based on type and severity of pain and evaluate response  - Implement non-pharmacological measures as appropriate and evaluate response  - Consider cultural and social influences on pain and pain management  - Notify physician/advanced practitioner if interventions unsuccessful or patient reports new pain  Outcome: Progressing     Problem: INFECTION - ADULT  Goal: Absence or prevention of progression during hospitalization  Description: INTERVENTIONS:  - Assess and monitor for signs and symptoms of infection  - Monitor lab/diagnostic results  - Monitor all insertion sites, i.e. indwelling lines, tubes, and drains  - Monitor endotracheal if appropriate and nasal secretions for changes in amount and color  - [place] appropriate cooling/warming therapies per order  - Administer medications as ordered  - Instruct and encourage patient and family to use good hand hygiene technique  - Identify and instruct in appropriate isolation precautions for identified infection/condition  Outcome: Progressing  Goal: Absence of fever/infection during neutropenic period  Description: INTERVENTIONS:  - Monitor WBC    Outcome: Progressing     Problem: SAFETY ADULT  Goal: Patient will remain free of falls  Description: INTERVENTIONS:  - Educate patient/family on patient safety including physical limitations  - Instruct patient to call for assistance with activity   - Consult OT/PT to assist with strengthening/mobility   - Keep Call bell within reach  - Keep bed low and locked with side rails adjusted as appropriate  - Keep care items and personal belongings within reach  - Initiate and maintain comfort rounds  - Make Fall Risk Sign visible to staff  - Offer Toileting every  Hours,  in advance of need  - Initiate/Maintain alarm  - Obtain necessary fall risk management equipment:   - Apply yellow socks and bracelet for high fall risk patients  - Consider moving patient to room near nurses station  Outcome: Progressing  Goal: Maintain or return to baseline ADL function  Description: INTERVENTIONS:  -  Assess patient's ability to carry out ADLs; assess patient's baseline for ADL function and identify physical deficits which impact ability to perform ADLs (bathing, care of mouth/teeth, toileting, grooming, dressing, etc.)  - Assess/evaluate cause of self-care deficits   - Assess range of motion  - Assess patient's mobility; develop plan if impaired  - Assess patient's need for assistive devices and provide as appropriate  - Encourage maximum independence but intervene and supervise when necessary  - Involve family in performance of ADLs  - Assess for home care needs following discharge   - Consider OT consult to assist with ADL evaluation and planning for discharge  - Provide patient education as appropriate  Outcome: Progressing  Goal: Maintains/Returns to pre admission functional level  Description: INTERVENTIONS:  - Perform AM-PAC 6 Click Basic Mobility/ Daily Activity assessment daily.  - Set and communicate daily mobility goal to care team and patient/family/caregiver.   - Collaborate with rehabilitation services on mobility goals if consulted  - Perform Range of Motion  times a day.  - Reposition patient every  hours.  - Dangle patient  times a day  - Stand patient  times a day  - Ambulate patient  times a day  - Out of bed to chair  times a day   - Out of bed for meals  times a day  - Out of bed for toileting  - Record patient progress and toleration of activity level   Outcome: Progressing     Problem: DISCHARGE PLANNING  Goal: Discharge to home or other facility with appropriate resources  Description: INTERVENTIONS:  - Identify barriers to discharge w/patient and caregiver  - Arrange for  needed discharge resources and transportation as appropriate  - Identify discharge learning needs (meds, wound care, etc.)  - Arrange for interpretive services to assist at discharge as needed  - Refer to Case Management Department for coordinating discharge planning if the patient needs post-hospital services based on physician/advanced practitioner order or complex needs related to functional status, cognitive ability, or social support system  Outcome: Progressing     Problem: Knowledge Deficit  Goal: Patient/family/caregiver demonstrates understanding of disease process, treatment plan, medications, and discharge instructions  Description: Complete learning assessment and assess knowledge base.  Interventions:  - Provide teaching at level of understanding  - Provide teaching via preferred learning methods  Outcome: Progressing     Problem: BIRTH - VAGINAL/ SECTION  Goal: Fetal and maternal status remain reassuring during the birth process  Description: INTERVENTIONS:  - Monitor vital signs  - Monitor fetal heart rate  - Monitor uterine activity  - Monitor labor progression (vaginal delivery)  - DVT prophylaxis  - Antibiotic prophylaxis  Outcome: Progressing  Goal: Emotionally satisfying birthing experience for mother/fetus  Description: Interventions:  - Assess, plan, implement and evaluate the nursing care given to the patient in labor  - Advocate the philosophy that each childbirth experience is a unique experience and support the family's chosen level of involvement and control during the labor process   - Actively participate in both the patient's and family's teaching of the birth process  - Consider cultural, Confucianism and age-specific factors and plan care for the patient in labor  Outcome: Progressing

## 2024-09-03 VITALS
BODY MASS INDEX: 28.17 KG/M2 | HEART RATE: 66 BPM | RESPIRATION RATE: 18 BRPM | OXYGEN SATURATION: 99 % | TEMPERATURE: 98.1 F | HEIGHT: 64 IN | SYSTOLIC BLOOD PRESSURE: 107 MMHG | WEIGHT: 165 LBS | DIASTOLIC BLOOD PRESSURE: 71 MMHG

## 2024-09-03 LAB
C AG RBC QL: NEGATIVE
E AG RBC QL: NEGATIVE
ERYTHROCYTE [DISTWIDTH] IN BLOOD BY AUTOMATED COUNT: 14.9 % (ref 11.6–15.1)
HCT VFR BLD AUTO: 32.9 % (ref 34.8–46.1)
HGB BLD-MCNC: 10.5 G/DL (ref 11.5–15.4)
KELL GROUP AG RBC: NEGATIVE
MCH RBC QN AUTO: 26.4 PG (ref 26.8–34.3)
MCHC RBC AUTO-ENTMCNC: 31.9 G/DL (ref 31.4–37.4)
MCV RBC AUTO: 83 FL (ref 82–98)
PLATELET # BLD AUTO: 212 THOUSANDS/UL (ref 149–390)
PMV BLD AUTO: 9.8 FL (ref 8.9–12.7)
RBC # BLD AUTO: 3.97 MILLION/UL (ref 3.81–5.12)
TREPONEMA PALLIDUM IGG+IGM AB [PRESENCE] IN SERUM OR PLASMA BY IMMUNOASSAY: NORMAL
WBC # BLD AUTO: 12.69 THOUSAND/UL (ref 4.31–10.16)

## 2024-09-03 PROCEDURE — 99024 POSTOP FOLLOW-UP VISIT: CPT | Performed by: OBSTETRICS & GYNECOLOGY

## 2024-09-03 PROCEDURE — 90707 MMR VACCINE SC: CPT | Performed by: OBSTETRICS & GYNECOLOGY

## 2024-09-03 PROCEDURE — 85027 COMPLETE CBC AUTOMATED: CPT | Performed by: OBSTETRICS & GYNECOLOGY

## 2024-09-03 PROCEDURE — NC001 PR NO CHARGE: Performed by: OBSTETRICS & GYNECOLOGY

## 2024-09-03 RX ORDER — DOCUSATE SODIUM 100 MG/1
100 CAPSULE, LIQUID FILLED ORAL 2 TIMES DAILY PRN
Qty: 30 CAPSULE | Refills: 0 | Status: SHIPPED | OUTPATIENT
Start: 2024-09-03

## 2024-09-03 RX ORDER — IBUPROFEN 600 MG/1
600 TABLET, FILM COATED ORAL EVERY 6 HOURS PRN
Qty: 30 TABLET | Refills: 0 | Status: SHIPPED | OUTPATIENT
Start: 2024-09-03

## 2024-09-03 RX ADMIN — ACETAMINOPHEN 650 MG: 325 TABLET ORAL at 03:34

## 2024-09-03 RX ADMIN — ACETAMINOPHEN 650 MG: 325 TABLET ORAL at 15:31

## 2024-09-03 RX ADMIN — DOCUSATE SODIUM 100 MG: 100 CAPSULE, LIQUID FILLED ORAL at 09:30

## 2024-09-03 RX ADMIN — ACETAMINOPHEN 650 MG: 325 TABLET ORAL at 11:09

## 2024-09-03 RX ADMIN — IBUPROFEN 600 MG: 600 TABLET, FILM COATED ORAL at 06:47

## 2024-09-03 RX ADMIN — MEASLES, MUMPS, AND RUBELLA VIRUS VACCINE LIVE 0.5 ML: 1000; 12500; 1000 INJECTION, POWDER, LYOPHILIZED, FOR SUSPENSION SUBCUTANEOUS at 13:11

## 2024-09-03 RX ADMIN — IBUPROFEN 600 MG: 600 TABLET, FILM COATED ORAL at 13:10

## 2024-09-03 RX ADMIN — RHO(D) IMMUNE GLOBULIN (HUMAN) 300 MCG: 1500 SOLUTION INTRAMUSCULAR at 13:15

## 2024-09-03 RX ADMIN — ACETAMINOPHEN 650 MG: 325 TABLET ORAL at 07:26

## 2024-09-03 NOTE — PROGRESS NOTES
"Ob Postpartum Note  Fabby Tatum 28 y.o. female MRN: [id number]  Unit/Bed#: -01 Encounter: [id number]    A/P.  28 y.o.  PPD#1 s/p delivery (Vaginal, Spontaneous) at 39w4d of 3350 g (7 lb 6.2 oz) male , apgars 8 /9 .  (1) PPD#1.  Delivered 2024  1:54 PM.  Would very much like to go home today.  --> Routine postpartum care.  --> For discharge in the afternoon if she continues well.    (2) Rh Negative.  Infant Rh positive.  --> RhoGAM.    (3) Rubella Non-Immune.  --> Vaccine prior to discharge.    Maninder Noonan MD  24  ---------------------------------------------------------------------------------------------    Subjective/    Feels well.  Tolerating po, ambulating, voiding without difficulty.  Happy.  Breastfeeding/bonding.  Mild cramps, appropriate lochia.    Objective/  Blood pressure 114/81, pulse 73, temperature 98.3 °F (36.8 °C), temperature source Oral, resp. rate 18, height 5' 3.5\" (1.613 m), weight 74.8 kg (165 lb), last menstrual period 2023, SpO2 100%, currently breastfeeding.    Patient Vitals for the past 24 hrs:   BP Temp Temp src Pulse Resp SpO2   24 0940 114/81 98.3 °F (36.8 °C) Oral 73 18 100 %   24 0730 -- 97.9 °F (36.6 °C) Oral -- -- --   24 0300 129/85 98.3 °F (36.8 °C) Oral 75 18 --   24 2300 128/74 98.4 °F (36.9 °C) Temporal 81 18 --   24 118/95 98.5 °F (36.9 °C) Oral 88 18 100 %   24 1616 103/56 99.3 °F (37.4 °C) Oral 83 18 --   24 1545 110/59 -- -- 83 -- --   24 1513 101/59 -- -- 87 -- --   24 1458 95/53 99.5 °F (37.5 °C) Oral (!) 111 -- --   24 1445 101/55 -- -- 90 -- --   24 1428 104/59 -- -- 82 -- --   24 1415 95/60 -- -- 81 -- --   24 1400 126/58 -- -- 85 -- --   24 1330 142/63 -- -- 95 -- --   24 1315 116/70 -- -- 71 -- --   24 1300 103/55 -- -- 72 -- --   24 1245 101/55 -- -- 76 -- --   24 1230 106/58 -- -- 70 -- --   24 1215 " 99/58 -- -- 67 -- --   09/02/24 1158 121/80 -- -- 81 -- --   09/02/24 1145 126/83 -- -- 74 -- --   09/02/24 1135 -- 98.1 °F (36.7 °C) Temporal -- -- --   09/02/24 1129 115/74 -- -- 80 -- --   09/02/24 1114 98/61 -- -- 67 -- --   09/02/24 1058 98/57 -- -- 64 -- --   09/02/24 1044 101/60 -- -- 64 -- --   09/02/24 1028 102/61 -- -- 64 -- --   09/02/24 1024 -- 99.1 °F (37.3 °C) Temporal -- -- --       Physical Exam/      General:  Alert, comfortable, NAD      Cardiovascular: Regular rate and rhythm      Respiratory: Clear to auscultation bilaterally.      Abdomen: Soft, non-tender, non-distended      Fundus: Firm, below umbilicus, nontender      Extremities: Warm, non-tender      Labs/      Blood type:  A-      Rubella: Non-Immune      Lab Results   Component Value Date    WBC 12.69 (H) 09/03/2024    HGB 10.5 (L) 09/03/2024    HCT 32.9 (L) 09/03/2024    MCV 83 09/03/2024     09/03/2024

## 2024-09-03 NOTE — DISCHARGE SUMMARY
Discharge Summary - Fabby Tatum 28 y.o. female MRN: [id number]  Unit/Bed#: -01 Encounter: [id number]    ADMISSION  Admission Date: 2024  Admitting Attending: Dr. Alvarado  Admitting Diagnoses:  Patient Active Problem List   Diagnosis    Adeola isoimmunization during pregnancy    Nonimmune to hepatitis B virus    Rh negative, antepartum    Rubella non-immune status, antepartum    Postpartum state    Lactating mother       DELIVERY  Delivery Method: Vaginal, Spontaneous  Delivery Date and Time: 2024 1:54 PM  Delivery Attending: Tyshawn Paulino    DISCHARGE  Discharge Date: 9/3/24  Discharge Attending:   Discharge Diagnosis:  Same, Delivered    Clinical course: Admission to Delivery  Fabby Tatum is a 28 y.o.  who was admitted at 39w4d in early labor.  Pt was in spontaneous labor and managed expectantly.  She progressed to complete and began pushing.    Delivery  Route of Delivery: Vaginal, Spontaneous  Anesthesia: Epidural,  QBL: Non-Surgical QBL (mL): 189       Delivery: Vaginal, Spontaneous at 2024 1:54 PM  Laceration: Perineal: 2° Repaired? Yes     Baby's Weight: 3350 g (7 lb 6.2 oz); 118.17   Apgar scores: 8  and 9  at 1 and 5 minutes, respectively     Clinical Course: Post-Delivery:  The post delivery course was unremarkable.  On the day of discharge, the patient was ambulating, voiding spontaneously, tolerating oral intake, and hemodynamically stable. She was able to reasonably perform all ADLs. She had appropriate bowel function. Pain was well-controlled. She was discharged home on postpartum/postop day #1 without complications. Patient was instructed to follow up with her OB as an outpatient and was given appropriate warnings to call her provider with problems or concerns.  Pertinent lab findings included:    Blood type A negative    Rubella Non-Immune    Prior to discharge, she received RhoGAM, as well as her MMR vaccine.    Last three Hgb values:  Lab Results   Component  Value Date    HGB 10.5 (L) 2024    HGB 12.0 2024    HGB 12.4 2024       Problem-specific follow-up plans included the following:  Problem List       Dwight isoimmunization during pregnancy    Overview     Formatting of this note might be different from the original.   Titer 16, Partner Héctor Tatum  1996 Negative Adeola Antigen   Fetus NOT at risk- Per MFM [facility]      MFM recommmendations:    Pt is to obtain documentation that FOB is negative for Dwight Antigen.    If the documentation cannot be procured then genetic counseling is  recommended to ensure that all the information that is required is  available to indeed confirm that the fetus is not at risk for Adeola  immunization.  Partner's documentation brought 2024 and scanned into patient's chart.   Growth US at 35 weeks         Nonimmune to hepatitis B virus    Overview     Formatting of this note might be different from the original.   #1 24      Last Assessment & Plan:    Formatting of this note might be different from the original.   First dose today         Rh negative, antepartum    Overview     Rhogam given 2024         Rubella non-immune status, antepartum    Overview     Formatting of this note might be different from the original.   Administer post partum      Last Assessment & Plan:    Formatting of this note might be different from the original.   To be given post partum         * (Principal) Postpartum state    Overview     18 week transfer from [facility]. Records in Care Everywhere.         Lactating mother       Discharge med list:     Medication List      ASK your doctor about these medications     EpiPen 2-Kevin 0.3 mg/0.3 mL Soaj; Generic drug: EPINEPHrine   prenatal multivitamin Tabs       Condition at discharge:  good    Disposition:  Home    Planned Readmission:  No

## 2024-09-03 NOTE — LACTATION NOTE
This note was copied from a baby's chart.  CONSULT - LACTATION  Baby Boy (Inessa Tatum 1 days male MRN: [id number]    [facility] NURSERY Room / Bed: (N)/(N) Encounter: [id number]    Maternal Information     MOTHER:  Fabby Tatum  Maternal Age: 28 y.o.  OB History: # 1 - Date: 24, Sex: Male, Weight: 3350 g (7 lb 6.2 oz), GA: 39w4d, Type: Vaginal, Spontaneous, Apgar1: 8, Apgar5: 9, Living: Living, Birth Comments: None   Previouse breast reduction surgery? No    Lactation history:   Has patient previously breast fed: No   How long had patient previously breast fed:     Previous breast feeding complications:     History reviewed. No pertinent surgical history.    Birth information:  YOB: 2025   Time of birth: 1:54 PM   Sex: male   Delivery type: Vaginal, Spontaneous   Birth Weight: 3350 g (7 lb 6.2 oz)   Percent of Weight Change: -4%     Gestational Age: 39w4d   [unfilled]    Assessment     Breast and nipple assessment:  round breasts with small nipples left nipple flat but everts with stimulation.     [place] Assessment: sleepy    Feeding assessment: no latch  LATCH:  Latch: Too sleepy or reluctant, no latch achieved   Audible Swallowing: None   Type of Nipple: Everted (After stimulation)   Comfort (Breast/Nipple): Soft/non-tender   Hold (Positioning): Partial assist, teach one side, mother does other, staff holds   LATCH Score: 5           24 0830   Lactation Consultation   Reason for Consult 20;20 min;15 min   Lactation Consultant Total Time 55   Maternal Information   Has mother  before? No   Infant to breast within first hour of birth? Yes   Exclusive Pump and Bottle Feed No   LATCH Documentation   Latch 0   Audible Swallowing 0   Type of Nipple 2   Comfort (Breast/Nipple) 2   Hold (Positioning) 1   LATCH Score 5   Having latch problems? Yes   Position(s) Used Cross Cradle;Football   Breasts/Nipples   Date Pumping Initiated  09/03/24   Time Pumping Initiated 0913   Left Breast Soft   Right Breast Soft   Left Nipple Everted   Right Nipple Everted   Intervention Hand expression   Breastfeeding Progress Not yet established   Other OB Lactation Tools   Feeding Devices Syringe;Pump   Breast Pump   Pump 3;2;1  (Medela Hands free)   Pump Review/Education Setup, frequency, and cleaning;Milk storage   Initiated by ANASTACIA LOUIS   Date Initiated 09/03/24   Patient Follow-Up   Lactation Consult Status 2   Follow-Up Type Inpatient;Call as needed   Other OB Lactation Documentation    Additional Problem Noted Assisted with trying to latch [place] to breast. Demonstration with teach back of football hold on right breast. Baby latches and unlatches before becoming frustrated. Attempted cross cradle hold and baby fell asleep. Set mom up to pump. Cycled through a pumping session, hands on pumping and hand expression. drops collected. Mixed feeding plan.  (RSB and DC booklet reviewed.)       Feeding recommendations: mixed feeding plan   Mom states breastfeeding has been going okay. Short feedings. Osborne is unlatching and re-latching to breast before falling asleep. Mom attempted to nurse in football position on right breast. Repositioned mom and baby in proper alignment. Baby latches a few times before unlatching but will not maintain latch. LC attempted to latch but baby un reluctant to latch at breast. Transitioned to left breast in cross cradle hold.No latch achieved, baby hanging on breast. Baby became fussy, and brought up S2S. Fell asleep on mom's chest. Reviewed benefits of S2S. Set mom up to pump; hands on pumping, and hand expression demonstrated. Cycled through a pumping session with mom. Drops collected in flange. Reviewed babies bellies and milk amounts. Encouraged mom to sit with baby S2S and try to relatch when baby is cueing. Mixed feeding plan discussed. Mom verbalized understanding. Encouraged to call for further assistance.     Feeding Plan:      1. Meet early feeding cues   2. Bring baby to breast skin to skin   3. Extend chin, anchoring it onto the breast to assist with deeper latch   4. Align nipple to nose, not sliding it to the lower lip, but instead, pivoting the compressed areola over the lower lip if using parent led latching so as to have the nipple come to rest in the arch of the baby's mouth   5. May use breast compressions to stimulate suck and provide more breast milk for enticement.   6. Introduce breast first for feeding, unless baby is not latching. May use hand expression to entice baby to wake   7. Feed infant expressed breast milk after breast feeding   8. Then, feed baby formula/donor breast milk per your preference   9.  Pump after as many feedings at the breast as reasonable   10. Follow up with outpatient lactation as soon as possible     Information on hand expression given. Discussed benefits of knowing how to manually express breast including stimulating milk supply, softening nipple for latch and evacuating breast in the event of engorgement.    Provided demonstration, education and support of deep latch to breast by placing the nipple to the nose, dragging down to chin to achieve a wide latch. Bring baby to the breast, not breast to baby. Move your shoulders down and away from your ears. Look for ear, shoulder, hip alignment. Baby's upper and lower lip should be flanged on the breast.    Pumping:   - When pumping, begin in stimulation mode (high cycle, low vacuum) until milk begins to express. Change pump to expression mode (low cycle, high vacuum). Use hands on pumping techniques to assist with milk transfer. When milk stops expressing, change back to stimulation mode. When milk begins to flow, change to expression mode. You may cycle pump up to three times in a pumping session.  Instructions given on pumping.  Discussed when to start, frequency, different pumps available versus manual expression.    Met with mother. Provided  mother with Ready, Set, Baby booklet which contained information on:  Hand expression with access to QR codes to review hand expression.  Positioning and latch reviewed as well as showing images of other feeding positions.  Discussed the properties of a good latch in any position.   Feeding on cue and what that means for recognizing infant's hunger, s/s that baby is getting enough milk and some s/s that breastfeeding dyad may need further help  Skin to Skin contact an benefits to mom and baby  Avoidance of pacifiers for the first month discussed.   Gave information on common concerns, what to expect the first few weeks after delivery, preparing for other caregivers, and how partners can help. Resources for support also provided.    Met with mother to go over discharge breastfeeding booklet including the feeding log. Emphasized 8 or more (12) feedings in a 24 hour period, what to expect for the number of diapers per day of life and the progression of properties of the  stooling pattern.    List of reasons to call a lactation consultant.  Feeding logs  Feeding cues  Hand expression  Baby's Second day (cluster feeding)  Breastfeeding and Your Lifestyle (Medications, Alcohol, Caffeine, Smoking, Street Drugs, Methadone)  First Two Weeks Survival Guide for Breastfeeding  Breast Changes  Physical Therapy  Storage and Handling of Breast milk  How to Keep Your Breast Pump Kit Clean  The Employed Breastfeeding Mother  Mixed feeding  Bottle feeding like breastfeeding (paced bottle feeding)  astfeeding and your lifestyle, storage and preparation of breast milk, how to keep you breast pump clean, the employed breastfeeding mother and paced bottle feeding handouts.     Booklet included Breastfeeding Resources for after discharge including access to the number for the [facility].      Shruti Gann MA 9/3/2024 9:26 AM

## 2024-09-03 NOTE — NURSING NOTE
RN reviewed discharge paperwork with parents. Patient verbalized understanding of education and asked appropriate questions. All questions acknowledged and answered.

## 2024-09-03 NOTE — PLAN OF CARE
Problem: PAIN - ADULT  Goal: Verbalizes/displays adequate comfort level or baseline comfort level  Description: Interventions:  - Encourage patient to monitor pain and request assistance  - Assess pain using appropriate pain scale  - Administer analgesics based on type and severity of pain and evaluate response  - Implement non-pharmacological measures as appropriate and evaluate response  - Consider cultural and social influences on pain and pain management  - Notify physician/advanced practitioner if interventions unsuccessful or patient reports new pain  Outcome: Progressing     Problem: INFECTION - ADULT  Goal: Absence or prevention of progression during hospitalization  Description: INTERVENTIONS:  - Assess and monitor for signs and symptoms of infection  - Monitor lab/diagnostic results  - Monitor all insertion sites, i.e. indwelling lines, tubes, and drains  - Monitor endotracheal if appropriate and nasal secretions for changes in amount and color  - [place] appropriate cooling/warming therapies per order  - Administer medications as ordered  - Instruct and encourage patient and family to use good hand hygiene technique  - Identify and instruct in appropriate isolation precautions for identified infection/condition  Outcome: Progressing  Goal: Absence of fever/infection during neutropenic period  Description: INTERVENTIONS:  - Monitor WBC    Outcome: Progressing     Problem: SAFETY ADULT  Goal: Patient will remain free of falls  Description: INTERVENTIONS:  - Educate patient/family on patient safety including physical limitations  - Instruct patient to call for assistance with activity   - Consult OT/PT to assist with strengthening/mobility   - Keep Call bell within reach  - Keep bed low and locked with side rails adjusted as appropriate  - Keep care items and personal belongings within reach  - Initiate and maintain comfort rounds  - Make Fall Risk Sign visible to staff  - Apply yellow socks and bracelet  for high fall risk patients  - Consider moving patient to room near nurses station  Outcome: Progressing  Goal: Maintain or return to baseline ADL function  Description: INTERVENTIONS:  -  Assess patient's ability to carry out ADLs; assess patient's baseline for ADL function and identify physical deficits which impact ability to perform ADLs (bathing, care of mouth/teeth, toileting, grooming, dressing, etc.)  - Assess/evaluate cause of self-care deficits   - Assess range of motion  - Assess patient's mobility; develop plan if impaired  - Assess patient's need for assistive devices and provide as appropriate  - Encourage maximum independence but intervene and supervise when necessary  - Involve family in performance of ADLs  - Assess for home care needs following discharge   - Consider OT consult to assist with ADL evaluation and planning for discharge  - Provide patient education as appropriate  Outcome: Progressing  Goal: Maintains/Returns to pre admission functional level  Description: INTERVENTIONS:  - Perform AM-PAC 6 Click Basic Mobility/ Daily Activity assessment daily.  - Set and communicate daily mobility goal to care team and patient/family/caregiver.   - Collaborate with rehabilitation services on mobility goals if consulted  - Out of bed for toileting  - Record patient progress and toleration of activity level   Outcome: Progressing     Problem: DISCHARGE PLANNING  Goal: Discharge to home or other facility with appropriate resources  Description: INTERVENTIONS:  - Identify barriers to discharge w/patient and caregiver  - Arrange for needed discharge resources and transportation as appropriate  - Identify discharge learning needs (meds, wound care, etc.)  - Arrange for interpretive services to assist at discharge as needed  - Refer to Case Management Department for coordinating discharge planning if the patient needs post-hospital services based on physician/advanced practitioner order or complex needs  related to functional status, cognitive ability, or social support system  Outcome: Progressing     Problem: Knowledge Deficit  Goal: Patient/family/caregiver demonstrates understanding of disease process, treatment plan, medications, and discharge instructions  Description: Complete learning assessment and assess knowledge base.  Interventions:  - Provide teaching at level of understanding  - Provide teaching via preferred learning methods  Outcome: Progressing

## 2024-09-04 NOTE — PLAN OF CARE
Problem: PAIN - ADULT  Goal: Verbalizes/displays adequate comfort level or baseline comfort level  Description: Interventions:  - Encourage patient to monitor pain and request assistance  - Assess pain using appropriate pain scale  - Administer analgesics based on type and severity of pain and evaluate response  - Implement non-pharmacological measures as appropriate and evaluate response  - Consider cultural and social influences on pain and pain management  - Notify physician/advanced practitioner if interventions unsuccessful or patient reports new pain  Outcome: Progressing     Problem: INFECTION - ADULT  Goal: Absence or prevention of progression during hospitalization  Description: INTERVENTIONS:  - Assess and monitor for signs and symptoms of infection  - Monitor lab/diagnostic results  - Monitor all insertion sites, i.e. indwelling lines, tubes, and drains  - Monitor endotracheal if appropriate and nasal secretions for changes in amount and color  - [place] appropriate cooling/warming therapies per order  - Administer medications as ordered  - Instruct and encourage patient and family to use good hand hygiene technique  - Identify and instruct in appropriate isolation precautions for identified infection/condition  Outcome: Progressing  Goal: Absence of fever/infection during neutropenic period  Description: INTERVENTIONS:  - Monitor WBC    Outcome: Progressing     Problem: SAFETY ADULT  Goal: Patient will remain free of falls  Description: INTERVENTIONS:  - Educate patient/family on patient safety including physical limitations  - Instruct patient to call for assistance with activity   - Consult OT/PT to assist with strengthening/mobility   - Keep Call bell within reach  - Keep bed low and locked with side rails adjusted as appropriate  - Keep care items and personal belongings within reach  - Initiate and maintain comfort rounds  - Make Fall Risk Sign visible to staff  - Apply yellow socks and bracelet  Pt called to say she will be an hour late for her iron infusion. She ended up not showing. She did not answer her phone.   for high fall risk patients  - Consider moving patient to room near nurses station  Outcome: Progressing  Goal: Maintain or return to baseline ADL function  Description: INTERVENTIONS:  -  Assess patient's ability to carry out ADLs; assess patient's baseline for ADL function and identify physical deficits which impact ability to perform ADLs (bathing, care of mouth/teeth, toileting, grooming, dressing, etc.)  - Assess/evaluate cause of self-care deficits   - Assess range of motion  - Assess patient's mobility; develop plan if impaired  - Assess patient's need for assistive devices and provide as appropriate  - Encourage maximum independence but intervene and supervise when necessary  - Involve family in performance of ADLs  - Assess for home care needs following discharge   - Consider OT consult to assist with ADL evaluation and planning for discharge  - Provide patient education as appropriate  Outcome: Progressing  Goal: Maintains/Returns to pre admission functional level  Description: INTERVENTIONS:  - Perform AM-PAC 6 Click Basic Mobility/ Daily Activity assessment daily.  - Set and communicate daily mobility goal to care team and patient/family/caregiver.   - Collaborate with rehabilitation services on mobility goals if consulted  - Out of bed for toileting  - Record patient progress and toleration of activity level   Outcome: Progressing     Problem: DISCHARGE PLANNING  Goal: Discharge to home or other facility with appropriate resources  Description: INTERVENTIONS:  - Identify barriers to discharge w/patient and caregiver  - Arrange for needed discharge resources and transportation as appropriate  - Identify discharge learning needs (meds, wound care, etc.)  - Arrange for interpretive services to assist at discharge as needed  - Refer to Case Management Department for coordinating discharge planning if the patient needs post-hospital services based on physician/advanced practitioner order or complex needs  related to functional status, cognitive ability, or social support system  Outcome: Progressing     Problem: Knowledge Deficit  Goal: Patient/family/caregiver demonstrates understanding of disease process, treatment plan, medications, and discharge instructions  Description: Complete learning assessment and assess knowledge base.  Interventions:  - Provide teaching at level of understanding  - Provide teaching via preferred learning methods  Outcome: Progressing

## 2024-09-05 PROCEDURE — 88307 TISSUE EXAM BY PATHOLOGIST: CPT | Performed by: PATHOLOGY

## 2024-10-13 ENCOUNTER — NURSE TRIAGE (OUTPATIENT)
Dept: OTHER | Facility: OTHER | Age: 29
End: 2024-10-13

## 2024-10-14 NOTE — TELEPHONE ENCOUNTER
"Regarding: Question and concerns  ----- Message from Loan MOODY sent at 10/13/2024  8:01 PM EDT -----  \"I wanted to know if I can breast feed my baby if I had a low grade fever. \"    "

## 2024-10-14 NOTE — TELEPHONE ENCOUNTER
"Reason for Disposition  • Maternal illness, questions about    Additional Information  • Breastfeeding questions about mother (breast symptoms or feeling sick)    Answer Assessment - Initial Assessment Questions  1. MAIN QUESTION:  \"What is your main question about you and breastfeeding?\"      Breastfeeding with elevated temp  2. PAIN: \"Do you have breast pain?\" If yes: \"How bad is the pain?\" (Scale 1-10; or mild, moderate, severe)      Denies   3. ONSET: \"When did the breast pain start?\" (e.g., hours, days)      Denies   4. SKIN: \"Is the skin red?\"      Denies   5. LOCATION: \"Which breast?\" (e.g., left, right, both)      Denies   6. BREASTFEEDING: \"Are you still breastfeeding?\" OR \"Are you pumping (expressing milk) and giving it in a bottle?\" Reason: Mothers who are pumping excessively over 8x in 24 hours or at high pressure settings can cause trauma that can lead to pain or infection.      Confirms . Baseline   7. FEVER: \"Do you have a fever?\" If so, ask: \"What is it, how was it measured, and when did it start?\"      99.7  8. OTHER SYMPTOMS: \"Do you have any other symptoms?\" (e.g., weakness, chills, nausea)      Denies   9.  BABY: \"How is your baby feeding?\" \"How is your baby acting?\"    Baseline    Protocols used: Postpartum - Breastfeeding Guideline Selection-Adult-, Breastfeeding - Mother's Breast Symptoms or Illness-Pediatric-    "

## 2024-10-14 NOTE — TELEPHONE ENCOUNTER
C/o elevated temp (99.7). NO additional symptoms. Calling for care advice if should continue to breastfeed. Education provided. No additional symptoms reported. Care advice given. Informed to call back if worsening/developing symptoms. Verbalized understanding. Agreeable with disposition. No further questions.

## 2024-10-19 ENCOUNTER — NURSE TRIAGE (OUTPATIENT)
Dept: OTHER | Facility: OTHER | Age: 29
End: 2024-10-19

## 2024-10-19 DIAGNOSIS — N61.0 MASTITIS: Primary | ICD-10-CM

## 2024-10-19 RX ORDER — DICLOXACILLIN SODIUM 500 MG/1
500 CAPSULE ORAL 4 TIMES DAILY
Qty: 40 CAPSULE | Refills: 0 | Status: SHIPPED | OUTPATIENT
Start: 2024-10-19 | End: 2024-10-29

## 2024-10-19 NOTE — TELEPHONE ENCOUNTER
"Regarding: possible mastisis  ----- Message from Pilar HAYES sent at 10/19/2024 12:37 PM EDT -----  \" I think I have a clogged duck, it really hurts and I have a fever of 101. \"    "

## 2024-10-19 NOTE — TELEPHONE ENCOUNTER
"Reason for Disposition   [1] Breast looks infected (e.g., spreading redness) AND [2] no fever    Answer Assessment - Initial Assessment Questions  1. SYMPTOM: \"What's the main symptom you're concerned about?\" (e.g., pain, redness, swelling)      Breast is warm and painful  2. ONSET: \"When did the    start?\"      Today    3. LOCATION: \"Which breast?\" (e.g., left, right, both) \"Is the pain in the breast or just the nipple?\"      Left    4. PAIN: \"How bad is the pain?\"  (Scale 1-10; or mild, moderate, severe)      8/10    5. REDNESS: \"Does the skin appear red? Does the breast feel hot to touch?\"       Denies    6. LUMP OR SWELLING: \"Does the breast feel hard or have lumps?\"      None    7. DELIVERY DATE: \"When was your delivery date?\" \"Vaginal delivery or ?\"      6 weeks ago    8. BREASTFEEDING AND PUMPING: \"Did you breastfeed your baby or use a breast pump after delivery?\" If Yes, ask: \"When did you last breastfeed or pump your breasts?\"      Is breast feeding and pumping    9. FEVER: \"Do you have a fever?\" If Yes, ask: \"What is it, how was it measured, and when did it start?\"      101 (oral)    10. OTHER SYMPTOMS: \"Do you have any other symptoms?\" (e.g., feeling sad or depressed, nipple symptoms)        Noticed small amount of yellow discharge on the nipple.    Protocols used: Postpartum - Breast Pain and Engorgement-Adult-[facility]        Per Dr. Green- Sounds like pt has mastitis. Pt should keep pumping from that side, breast massage, warm compresses prior to pumping. Motrin 600mg Q 6 hrs whether she has pain or not for the next 24 hrs. Dicloxacillin 500mg by mouth every 6 hrs for 10 days (total of 40 pills). Should see improvement in 48 hrs after starting meds.     Pt aware and verbalized understanding.     Dicloxacillin sent in for pt.     "

## 2024-10-22 ENCOUNTER — POSTPARTUM VISIT (OUTPATIENT)
Dept: OBGYN CLINIC | Facility: CLINIC | Age: 29
End: 2024-10-22

## 2024-10-22 VITALS
DIASTOLIC BLOOD PRESSURE: 70 MMHG | HEIGHT: 64 IN | WEIGHT: 143 LBS | SYSTOLIC BLOOD PRESSURE: 110 MMHG | BODY MASS INDEX: 24.41 KG/M2

## 2024-10-22 PROCEDURE — 99024 POSTOP FOLLOW-UP VISIT: CPT | Performed by: OBSTETRICS & GYNECOLOGY

## 2024-10-22 NOTE — PROGRESS NOTES
"[facility] [address], [address], [place] [address]    Assessment/Plan:  Fabby is a 28 y.o. year old  who presents for postpartum visit.    Routine Postpartum Care  Normal postpartum exam  Contraception: condoms  Depression Screen: Low risk  Feeding: Breast  Psychosocial support: Good  Patient Education: \"Fourth Trimester Project: [employer]\"  Cervical cancer screening Up to Date  Follow up in: 3 months or as needed.    Additional Problems:  1. Routine postpartum follow-up        Subjective:     CC: Postpartum visit    Fabby Tatum is a 28 y.o. y.o. female  who presents for a postpartum visit.     She is 6 weeks postpartum following a spontaneous vaginal delivery on 2024 at 39.4 weeks.    Outcome: spontaneous vaginal delivery. Anesthesia: epidural. Postpartum course has been unremarkable. Baby's course has been unremarkable. Baby is feeding by breast.     Bleeding no bleeding. Bowel function is normal. Bladder function is normal. Patient is not sexually active. Contraception method is condoms. Postpartum depression screening: negative.    The following portions of the patient's history were reviewed and updated as appropriate: allergies, current medications, past family history, past medical history, obstetric history, gynecologic history, past social history, past surgical history and problem list.      Objective:  /70 (BP Location: Right arm, Patient Position: Sitting, Cuff Size: Standard)   Ht 5' 3.5\" (1.613 m)   Wt 64.9 kg (143 lb)   LMP 2023   Breastfeeding Yes   BMI 24.93 kg/m²   Pregravid Weight/BMI: 65.8 kg (145 lb) (BMI 25.28)  Current Weight: 64.9 kg (143 lb)   Total Weight Gain: 9.073 kg (20 lb)   Pre- Vitals      Flowsheet Row Most Recent Value   Prenatal Assessment    Prenatal Vitals    Blood Pressure 110/70   Weight - Scale 64.9 kg (143 lb)   Urine Albumin/Glucose    Dilation/Effacement/Station    Vaginal Drainage  "   Edema              General: Well appearing, no distress.  Mood and affect: Appropriate.  Abdomen: Soft, nontender  Thyroid: No masses  Incision: n/a  Vulva: Well healed  Vagina: Well healed. No lesions  Urethra: Normal  Cervix: Healed, no lesions  Uterus: Normal size, no masses  Adnexa: No pain or masses  Extremities: Warm and well perfused.  Non tender.

## 2024-11-01 ENCOUNTER — TELEPHONE (OUTPATIENT)
Age: 29
End: 2024-11-01

## 2024-11-01 ENCOUNTER — NURSE TRIAGE (OUTPATIENT)
Dept: OTHER | Facility: OTHER | Age: 29
End: 2024-11-01

## 2024-11-01 ENCOUNTER — TELEPHONE (OUTPATIENT)
Dept: OTHER | Facility: OTHER | Age: 29
End: 2024-11-01

## 2024-11-01 ENCOUNTER — NURSE TRIAGE (OUTPATIENT)
Age: 29
End: 2024-11-01

## 2024-11-01 DIAGNOSIS — N61.0 MASTITIS: Primary | ICD-10-CM

## 2024-11-01 RX ORDER — DICLOXACILLIN SODIUM 500 MG/1
500 CAPSULE ORAL 4 TIMES DAILY
Qty: 56 CAPSULE | Refills: 0 | Status: SHIPPED | OUTPATIENT
Start: 2024-11-01 | End: 2024-11-01

## 2024-11-01 RX ORDER — DICLOXACILLIN SODIUM 500 MG/1
500 CAPSULE ORAL 4 TIMES DAILY
Qty: 40 CAPSULE | Refills: 0 | Status: SHIPPED | OUTPATIENT
Start: 2024-11-01 | End: 2024-11-01

## 2024-11-01 RX ORDER — DICLOXACILLIN SODIUM 500 MG/1
500 CAPSULE ORAL 4 TIMES DAILY
Qty: 56 CAPSULE | Refills: 0 | Status: SHIPPED | OUTPATIENT
Start: 2024-11-01 | End: 2024-11-15

## 2024-11-01 NOTE — TELEPHONE ENCOUNTER
Pt needs her medication Dicloxacillin sent to [address] in [place]. [facility] does not have in stock. Also the pharmacy states it is written incorrectly. States take for ten days and also states take for 14 days.

## 2024-11-01 NOTE — TELEPHONE ENCOUNTER
"Postpartum patient delivered 24 vaginally. Treated for Mastitis of right breast 10/19/24 with Dicolaxacillin - last dose taken Monday. States that she believes she is starting to have Mastitis symptoms again in top of Left breast. Started yesterday 10/31. Left breast pain present. Denies redness, fever, chills.     ESC sent to Dr. Cardenas - STACEY will send another script for antibiotics to pharmacy for her. Continue nursing, empting breasts. Motrin for pain. If symptoms worsening or develops fever after 2 days on antibiotics, then she should call for further evalution.     Outgoing call to patient. Informed of provider's response and patient verbalized understanding.     Answer Assessment - Initial Assessment Questions  1. SYMPTOM: \"What's the main symptom you're concerned about?\" (e.g., pain, redness, swelling)      Left breast pain  2. ONSET: \"When did the  symptoms  start?\"      10/31  3. LOCATION: \"Which breast?\" (e.g., left, right, both) \"Is the pain in the breast or just the nipple?\"      Left breast  4. PAIN: \"How bad is the pain?\"  (Scale 1-10; or mild, moderate, severe)      moderate  5. REDNESS: \"Does the skin appear red? Does the breast feel hot to touch?\"       No redness  6. LUMP OR SWELLING: \"Does the breast feel hard or have lumps?\"      Lump in left breast  7. DELIVERY DATE: \"When was your delivery date?\" \"Vaginal delivery or ?\"      24  8. BREASTFEEDING AND PUMPING: \"Did you breastfeed your baby or use a breast pump after delivery?\" If Yes, ask: \"When did you last breastfeed or pump your breasts?\"      Denies   9. FEVER: \"Do you have a fever?\" If Yes, ask: \"What is it, how was it measured, and when did it start?\"      denies  10. OTHER SYMPTOMS: \"Do you have any other symptoms?\" (e.g., feeling sad or depressed, nipple symptoms)        Denies    Protocols used: Postpartum - Breast Pain and Engorgement-Adult-[place]    "

## 2024-11-01 NOTE — TELEPHONE ENCOUNTER
"Regarding: Mastitis, Medication Problem  ----- Message from Caro SHER sent at 11/1/2024  7:32 PM EDT -----  \" I have Mastitis and was prescribed dicloxacillin (DYNAPEN) 500 MG capsule, [facility] it and it was sent to [facility] but my Insurance won't pay for it.because they think it's at [facility].\"    "

## 2024-11-01 NOTE — TELEPHONE ENCOUNTER
Jeff from pharmacy calling in stating that the medication Dynapen states the sig is: Sig: Take 1 capsule (500 mg total) by mouth 4 (four) times a day for 10 days Take every 6 hours for 14 days.  Complete entire course.     Pharmacist is asking for clarification

## 2024-11-02 NOTE — TELEPHONE ENCOUNTER
"Reason for Disposition  • Health information question, no triage required and triager able to answer question    Answer Assessment - Initial Assessment Questions  1. REASON FOR CALL: \"What is the main reason for your call?\" or \"How can I best help you?\"      Needed rx for abx sent to a different pharmacy  2. SYMPTOMS : \"Do you have any symptoms?\"       N/a  3. OTHER QUESTIONS: \"Do you have any other questions?\"      No    Protocols used: Information Only Call - No Triage-Adult-    "

## 2024-11-06 ENCOUNTER — TELEPHONE (OUTPATIENT)
Age: 29
End: 2024-11-06

## 2024-11-06 NOTE — TELEPHONE ENCOUNTER
Patient calling in stating that she is taking dicloxacillin and pt stating that she also has a cold, pt is wanting to know what she can take and is safe for breastfeeding as well as taking the antibiotic, pt is referred to call PCP regarding the cold and to call pharmacy for further medication questions, pt verbalized understanding.

## 2024-11-08 ENCOUNTER — OFFICE VISIT (OUTPATIENT)
Dept: OBGYN CLINIC | Facility: CLINIC | Age: 29
End: 2024-11-08
Payer: COMMERCIAL

## 2024-11-08 ENCOUNTER — NURSE TRIAGE (OUTPATIENT)
Age: 29
End: 2024-11-08

## 2024-11-08 ENCOUNTER — OFFICE VISIT (OUTPATIENT)
Dept: URGENT CARE | Facility: CLINIC | Age: 29
End: 2024-11-08
Payer: COMMERCIAL

## 2024-11-08 VITALS
HEIGHT: 64 IN | TEMPERATURE: 99.2 F | HEART RATE: 111 BPM | WEIGHT: 141 LBS | DIASTOLIC BLOOD PRESSURE: 70 MMHG | BODY MASS INDEX: 24.07 KG/M2 | RESPIRATION RATE: 16 BRPM | SYSTOLIC BLOOD PRESSURE: 120 MMHG | OXYGEN SATURATION: 95 %

## 2024-11-08 VITALS
HEIGHT: 64 IN | WEIGHT: 141.2 LBS | BODY MASS INDEX: 24.11 KG/M2 | SYSTOLIC BLOOD PRESSURE: 112 MMHG | DIASTOLIC BLOOD PRESSURE: 68 MMHG

## 2024-11-08 DIAGNOSIS — N61.0 MASTITIS: ICD-10-CM

## 2024-11-08 DIAGNOSIS — N89.8 VAGINAL DISCHARGE: ICD-10-CM

## 2024-11-08 DIAGNOSIS — R05.1 ACUTE COUGH: Primary | ICD-10-CM

## 2024-11-08 PROCEDURE — S9083 URGENT CARE CENTER GLOBAL: HCPCS | Performed by: PHYSICIAN ASSISTANT

## 2024-11-08 PROCEDURE — 99213 OFFICE O/P EST LOW 20 MIN: CPT | Performed by: OBSTETRICS & GYNECOLOGY

## 2024-11-08 PROCEDURE — G0382 LEV 3 HOSP TYPE B ED VISIT: HCPCS | Performed by: PHYSICIAN ASSISTANT

## 2024-11-08 NOTE — PROGRESS NOTES
PROBLEM GYNECOLOGICAL VISIT    Fabby Tatum is a 28 y.o. female who presents today with complaint of discharge from   suture area and pain .  Her general medical history has been reviewed and she reports it as follows:    Past Medical History:   Diagnosis Date    [place] isoimmunization during pregnancy      History reviewed. No pertinent surgical history.  OB History          1    Para   1    Term   1            AB        Living   1         SAB        IAB        Ectopic        Multiple   0    Live Births   1               Social History     Tobacco Use    Smoking status: Never     Passive exposure: Never    Smokeless tobacco: Never   Vaping Use    Vaping status: Never Used   Substance Use Topics    Alcohol use: Not Currently     Alcohol/week: 1.0 standard drink of alcohol     Types: 1 Glasses of wine per week    Drug use: Never       Current Outpatient Medications   Medication Instructions    dicloxacillin (DYNAPEN) 500 mg, Oral, 4 times daily, Take every 6 hours for 14 days.  Complete entire course.    EPINEPHrine (EpiPen 2-Kevin) 0.3 mg/0.3 mL SOAJ As needed    Prenatal Vit-Iron Carbonyl-FA (prenatal multivitamin) TABS        History of Present Illness:   Pt  del    9/2  + sutures. Using  tucks and   dermoplast   to area.   No relations since delivery.  + treated for mastitis  w  dicloxacillin and doing well.  + attached to  baby.  Last  24 hours  has been having pain at suture site  and noticed a yellow green dc.  No fevers or chills.  No bleeding      Review of Systems:  Review of Systems   Constitutional:  Negative for activity change, appetite change, fatigue and fever.   Gastrointestinal:  Negative for abdominal distention, abdominal pain, anal bleeding, diarrhea, nausea, rectal pain and vomiting.   Endocrine: Negative for cold intolerance and heat intolerance.   Genitourinary:  Positive for vaginal discharge and vaginal pain. Negative for difficulty urinating, dyspareunia, dysuria,  "flank pain, frequency, hematuria, menstrual problem, pelvic pain and vaginal bleeding.        Green jj dc and pain  at suture site    Musculoskeletal: Negative.    Hematological: Negative.    Psychiatric/Behavioral: Negative.       Physical Exam:  /68 (BP Location: Left arm, Patient Position: Sitting, Cuff Size: Standard)   Ht 5' 3.5\" (1.613 m)   Wt 64 kg (141 lb 3.2 oz)   Breastfeeding Yes   BMI 24.62 kg/m²   Physical Exam  Constitutional:       Appearance: Normal appearance.   Genitourinary:      Vulva, bladder, rectum and urethral meatus normal.      No lesions in the vagina.      Genitourinary Comments: Midline   intact  no erosion  +  mild touch tenderness at  7  oclock  1 cm inside the  vagina       Right Labia: No rash, tenderness, lesions, skin changes or Bartholin's cyst.     Left Labia: No tenderness, lesions, skin changes, Bartholin's cyst or rash.     No inguinal adenopathy present in the right or left side.     Pelvic Korey Score: 4/5.     Vaginal discharge present.      No vaginal erythema, tenderness, bleeding, ulceration, mesh exposure, granulation tissue or cuff induration.      No vaginal prolapse present.     No vaginal atrophy present.       Right Adnexa: not tender, not full and no mass present.     Left Adnexa: not tender, not full and no mass present.     Cervix is parous.      Cervix is not nulliparous.      No cervical motion tenderness, friability or lesion.      No parametrium nodularity or thickening present.     Uterus is not enlarged, fixed or tender.      No uterine mass detected.     No urethral prolapse or tenderness present.      Pelvic Floor: Levator muscle strength is 4/5.     Pelvic floor neuro is intact.     Pelvic exam was performed with patient in the lithotomy position.   Abdominal:      General: Abdomen is flat.      Palpations: Abdomen is soft.      Hernia: There is no hernia in the left inguinal area or right inguinal area.   Musculoskeletal:         General: " Normal range of motion.   Lymphadenopathy:      Lower Body: No right inguinal adenopathy. No left inguinal adenopathy.   Neurological:      Mental Status: She is alert and oriented to person, place, and time.   Skin:     General: Skin is warm and dry.       Assessment:   1.  Mastitis   vaginal pain   8 weeks PP      Plan:   No signs of   dehiscence present, no Sign of  infection present .  DC mucoid  no odor .    Continue with   present tx for mastitis.   May continue to use the   dermaplast spray and  tucks.   Tylenol or motrin for pain.  Keep site clean and  dry.  Rest and fluids.  Report  fevers > 100.4  increase in pain.  Instructed on kegel exercises  to assist with the pelvic floor   I have spent a total time of 20 minutes in caring for this patient on the day of the visit/encounter including Instructions for management, Patient and family education, Importance of tx compliance, Risk factor reductions, Counseling / Coordination of care, Documenting in the medical record, and Obtaining or reviewing history  .     Reviewed with patient that [facility], but that they will not necessarily be reviewed by me immediately.  Explained that I will review results at my earliest opportunity and contact patient appropriately.

## 2024-11-08 NOTE — PATIENT INSTRUCTIONS
Open to air as much as possible .  Report  fevers >  100.4.  keep site  clean and  dry .  Monistat  7 for  vaginal  itching .

## 2024-11-08 NOTE — PROGRESS NOTES
[facility]        NAME: Fabby Tatum is a 28 y.o. female  : 1995    MRN: [id number]  DATE: 2024  TIME: 4:08 PM    Assessment and Plan   Acute cough [R05.1]  1. Acute cough        2. Mastitis          Declined breast exam    Patient Instructions   Patient was educated to continue dicloxacillin as prescribed by OB/GYN.    Patient was educated on over-the-counter medication that is safe during breast-feeding.    Patient was told any chest pain or shortness of breath go directly to ED.    Patient was told to take Tylenol for any fever.    Patient was educated on OTC allergy medications.    Patient was given list of medications safe during pregnancy and while breast feeding.     Follow up with PCP in 3-5 days.  Proceed to  ER if symptoms worsen.    If tests have been performed at [facility], our office will contact you with results if changes need to be made to the care plan discussed with you at the visit.  [facility]    Chief Complaint     Chief Complaint   Patient presents with    Cough     Pt reports non-productive cough with onset one week ago. C/o progression of symptoms with bilateral ear fullness. Currently treating with dicloxacillin for mastitis. States day seven of day 14 treatment. Currently breastfeeding.          History of Present Illness       Patient is a pleasant 28-year-old female who presents today complaining of ear congestion, runny nose, ear pain, headache and cough.  Patient reports symptoms started 1 week ago.  1 week ago patient was also started on dicloxacillin for mastitis.  Patient has completed a week of treatment and reports the mastitis is feeling better.  Patient is currently breast-feeding.      Cough  This is a new problem. The current episode started in the past 7 days. The problem has been waxing and waning. The problem occurs hourly. The cough is Productive of sputum. Associated symptoms include  ear congestion, ear pain, headaches, nasal congestion, postnasal drip and rhinorrhea. Pertinent negatives include no chest pain, chills, fever, heartburn, hemoptysis, myalgias, rash, sore throat, shortness of breath, sweats, weight loss or wheezing. The symptoms are aggravated by lying down.       Review of Systems   Review of Systems   Constitutional:  Negative for chills, fever and weight loss.   HENT:  Positive for ear pain, postnasal drip and rhinorrhea. Negative for sore throat.    Respiratory:  Positive for cough. Negative for hemoptysis, shortness of breath and wheezing.    Cardiovascular:  Negative for chest pain.   Gastrointestinal:  Negative for heartburn.   Musculoskeletal:  Negative for myalgias.   Skin:  Negative for rash.   Neurological:  Positive for headaches.         Current Medications       Current Outpatient Medications:     dicloxacillin (DYNAPEN) 500 MG capsule, Take 1 capsule (500 mg total) by mouth 4 (four) times a day for 14 days Take every 6 hours for 14 days.  Complete entire course., Disp: 56 capsule, Rfl: 0    Prenatal Vit-Iron Carbonyl-FA (prenatal multivitamin) TABS, , Disp: , Rfl:     EPINEPHrine (EpiPen 2-Kevin) 0.3 mg/0.3 mL SOAJ, As needed, Disp: , Rfl:     Current Allergies     Allergies as of 11/08/2024 - Reviewed 11/08/2024   Allergen Reaction Noted    Minocycline Hives 01/15/2024    Pollen extract Other (See Comments) 04/05/2023            The following portions of the patient's history were reviewed and updated as appropriate: allergies, current medications, past family history, past medical history, past social history, past surgical history and problem list.     Past Medical History:   Diagnosis Date    [place] isoimmunization during pregnancy        No past surgical history on file.    Family History   Problem Relation Age of Onset    Heart failure Mother     Heart attack Paternal Grandfather     Stroke Paternal Grandfather     Heart attack Paternal Grandmother     Thyroid  "disease Paternal Grandmother          Medications have been verified.        Objective   /70 (BP Location: Left arm, Patient Position: Sitting)   Pulse (!) 111   Temp 99.2 °F (37.3 °C)   Resp 16   Ht 5' 3.5\" (1.613 m)   Wt 64 kg (141 lb)   SpO2 95%   BMI 24.59 kg/m²   No LMP recorded.       Physical Exam     Physical Exam  Vitals and nursing note reviewed.   Constitutional:       Appearance: Normal appearance.   HENT:      Head: Normocephalic.      Comments: No pressure or pain over frontal or maxillary sinus.     Ears:      Comments: Bilateral TMs are bulging with no signs of infection.     Mouth/Throat:      Comments: Postnasal drip  Eyes:      Extraocular Movements: Extraocular movements intact.      Pupils: Pupils are equal, round, and reactive to light.   Cardiovascular:      Rate and Rhythm: Normal rate and regular rhythm.      Heart sounds: Normal heart sounds.   Pulmonary:      Breath sounds: Normal breath sounds. No wheezing.   Neurological:      General: No focal deficit present.      Mental Status: She is alert and oriented to person, place, and time.   Psychiatric:         Mood and Affect: Mood normal.         Behavior: Behavior normal.                   "

## 2024-11-08 NOTE — PATIENT INSTRUCTIONS
Patient was educated to continue dicloxacillin as prescribed by OB/GYN.    Patient was educated on over-the-counter medication that is safe during breast-feeding.    Patient was told any chest pain or shortness of breath go directly to ED.    Patient was told to take Tylenol for any fever.

## 2024-11-08 NOTE — TELEPHONE ENCOUNTER
"Spoke with patient who delivered 9/2. She reports she did have a tear and had sutures. She reports green discharge noted, unsure if from there or vaginally.  She also reports that area has been sore but was told everything looked good.  She denies fevers.  Appt made for today.  No further questions or concerns at this time.    Reason for Disposition   [1] Perineum pain (area between vagina and anus) is getting WORSE AND [2] > 48 hours since delivery    Answer Assessment - Initial Assessment Questions  1. SYMPTOM: \"What's the main symptom you're concerned about?\" (e.g., pain, stitch tightness, swelling)      Pain at the site of sutures, green discharge, unsure if from that or vaginally.  2. ONSET: \"When did this  start?\"      2 days ago  3. APPEARANCE OF PERINEUM: \"How does the episiotomy or wound look?\" (e.g., abnormal discharge, broken stitch, gaping area or opening of incision, lump)      Unsure  4. DELIVERY DATE: \"When was your delivery date?\"      9/2  5. PAIN: \"Is there any pain?\" If Yes, ask: \"How bad is it?\" (Scale: 1-10; mild, moderate, severe)      mild  6. FEVER: \"Do you have a fever?\" If Yes, ask: \"What is your temperature, how was it measured, and when did it start?\"      denies  7. OTHER SYMPTOMS: \"Do you have any other symptoms?\" (e.g., abdomen pain, vaginal discharge, pain with urination)      denies    Protocols used: Postpartum - Episiotomy or Vaginal Laceration Symptoms-Adult-AH    "

## 2025-01-27 ENCOUNTER — ANNUAL EXAM (OUTPATIENT)
Dept: OBGYN CLINIC | Facility: CLINIC | Age: 30
End: 2025-01-27
Payer: COMMERCIAL

## 2025-01-27 VITALS
DIASTOLIC BLOOD PRESSURE: 68 MMHG | SYSTOLIC BLOOD PRESSURE: 118 MMHG | HEIGHT: 64 IN | BODY MASS INDEX: 24.92 KG/M2 | WEIGHT: 146 LBS

## 2025-01-27 DIAGNOSIS — Z01.419 ROUTINE GYNECOLOGICAL EXAMINATION: Primary | ICD-10-CM

## 2025-01-27 PROBLEM — O09.899 RUBELLA NON-IMMUNE STATUS, ANTEPARTUM: Status: RESOLVED | Noted: 2024-01-16 | Resolved: 2025-01-27

## 2025-01-27 PROBLEM — Z28.39 RUBELLA NON-IMMUNE STATUS, ANTEPARTUM: Status: RESOLVED | Noted: 2024-01-16 | Resolved: 2025-01-27

## 2025-01-27 PROBLEM — Z67.91 RH NEGATIVE, ANTEPARTUM: Status: RESOLVED | Noted: 2024-01-16 | Resolved: 2025-01-27

## 2025-01-27 PROBLEM — O26.899 RH NEGATIVE, ANTEPARTUM: Status: RESOLVED | Noted: 2024-01-16 | Resolved: 2025-01-27

## 2025-01-27 PROCEDURE — S0612 ANNUAL GYNECOLOGICAL EXAMINA: HCPCS | Performed by: OBSTETRICS & GYNECOLOGY

## 2025-01-27 NOTE — PROGRESS NOTES
[facility] [place]  [address] [place] [address]    ASSESSMENT/PLAN: Fabby Tatum is a 29 y.o.  who presents for annual gynecologic exam.    Encounter for routine gynecologic examination  - Routine well woman exam completed today.  - Cervical Cancer Screening: Current ASCCP Guidelines reviewed. Last Pap: 2023 . Next Pap Due:   - HPV Vaccination status: Immunization series complete  - STI screening offered including HIV testing: offered, pt declined  - Contraceptive counseling discussed.  Current contraception: condoms:   - The following were reviewed in today's visit: breast self exam and family planning choices    Additional problems addressed during this visit:  1. Routine gynecological examination      CC:  Annual Gynecologic Examination    HPI: Fabby Tatum is a 29 y.o.  who presents for annual gynecologic examination.  HPI    The following portions of the patient's history were reviewed and updated as appropriate: She  has a past medical history of [place] isoimmunization during pregnancy.  She  has no past surgical history on file.  Her family history includes Heart attack in her paternal grandfather and paternal grandmother; Heart failure in her mother; Stroke in her paternal grandfather; Thyroid disease in her paternal grandmother.  She  reports that she has never smoked. She has never been exposed to tobacco smoke. She has never used smokeless tobacco. She reports that she does not currently use alcohol after a past usage of about 1.0 standard drink of alcohol per week. She reports that she does not use drugs.  Current Outpatient Medications   Medication Sig Dispense Refill    EPINEPHrine (EpiPen 2-Kevin) 0.3 mg/0.3 mL SOAJ As needed      Prenatal Vit-Iron Carbonyl-FA (prenatal multivitamin) TABS        No current facility-administered medications for this visit.     She is allergic to minocycline and pollen extract..    ROS negative except as  "noted in HPI        Objective:  /68 (BP Location: Left arm, Patient Position: Sitting, Cuff Size: Standard)   Ht 5' 3.5\" (1.613 m)   Wt 66.2 kg (146 lb)   Breastfeeding Yes   BMI 25.46 kg/m²    Physical Exam      PE:  General Appearance: alert and oriented, in no acute distress.   HEENT: PERRL, thyroid without masses or tenderness  Breast: No masses, tenderness, skin changes, nipple D/C or axillary or supraclavicular adenopathy  Abdomen: Soft, non-tender, non-distended, no masses, no rebound or guarding.  Pelvic:       External genitalia: Normal appearance, no abnormal pigmentation, no lesions or masses. Normal Bartholin's and [place]'s.      Urinary system: Urethral meatus normal, bladder non-tender.      Vaginal: normal mucosa without prolapse or lesions. Normal-appearing physiologic discharge      Cervix: Normal-appearing, well-epithelialized, no gross lesions or masses No cervical motion tenderness.      Adnexa: No adnexal masses or tenderness noted.      Uterus: Normal-sized, regular contour, midline, mobile, no uterine tenderness.  Extremities: Normal range of motion.   Skin: normal, no rash or abnormalities  Neurologic: alert, oriented x3  Psychiatric: Appropriate affect, mood stable, cooperative with exam.  "

## 2025-03-04 ENCOUNTER — OFFICE VISIT (OUTPATIENT)
Dept: URGENT CARE | Facility: CLINIC | Age: 30
End: 2025-03-04
Payer: COMMERCIAL

## 2025-03-04 VITALS
TEMPERATURE: 97.7 F | DIASTOLIC BLOOD PRESSURE: 72 MMHG | SYSTOLIC BLOOD PRESSURE: 110 MMHG | HEIGHT: 63 IN | OXYGEN SATURATION: 99 % | RESPIRATION RATE: 16 BRPM | BODY MASS INDEX: 25.87 KG/M2 | HEART RATE: 79 BPM | WEIGHT: 146 LBS

## 2025-03-04 DIAGNOSIS — L50.9 HIVES: Primary | ICD-10-CM

## 2025-03-04 PROCEDURE — S9083 URGENT CARE CENTER GLOBAL: HCPCS | Performed by: PHYSICIAN ASSISTANT

## 2025-03-04 PROCEDURE — G0382 LEV 3 HOSP TYPE B ED VISIT: HCPCS | Performed by: PHYSICIAN ASSISTANT

## 2025-03-04 RX ORDER — PREDNISONE 20 MG/1
40 TABLET ORAL DAILY
Qty: 10 TABLET | Refills: 0 | Status: SHIPPED | OUTPATIENT
Start: 2025-03-04 | End: 2025-03-09

## 2025-03-04 NOTE — PROGRESS NOTES
"  [facility]        NAME: Fabby Tatum is a 29 y.o. female  : 1995    MRN: [id number]  DATE: 2025  TIME: 7:00 PM    Assessment and Plan   Hives [L50.9]  1. Hives  predniSONE 20 mg tablet            Patient Instructions       Follow up with PCP in 3-5 days.  Proceed to  ER if symptoms worsen.    If tests have been performed at [facility], our office will contact you with results if changes need to be made to the care plan discussed with you at the visit.  You can review your full results on [facility].    Chief Complaint     Chief Complaint   Patient presents with    Rash     Pt reports multi site hive like rash with onset this morning. Managing with Claritin. Denies any new introduction of food, product, or medication. Currently breastfeeding.         History of Present Illness       Patient presents with hives developing \"everywhere\" this morning.  Took Claritin with some relief. Areas are itchy.   Denies new medications, foods, lotions, detergents, changes to ADLS, new clothing/sheets.   Denies fevers, trouble breathing/swallowing, congestion, cough, runny nose.         Review of Systems   Review of Systems   HENT:  Negative for trouble swallowing.    Respiratory:  Negative for shortness of breath.    Skin:  Positive for rash.         Current Medications       Current Outpatient Medications:     predniSONE 20 mg tablet, Take 2 tablets (40 mg total) by mouth daily for 5 days, Disp: 10 tablet, Rfl: 0    Prenatal Vit-Iron Carbonyl-FA (prenatal multivitamin) TABS, , Disp: , Rfl:     EPINEPHrine (EpiPen 2-Kevin) 0.3 mg/0.3 mL SOAJ, As needed, Disp: , Rfl:     Current Allergies     Allergies as of 2025 - Reviewed 2025   Allergen Reaction Noted    Minocycline Hives 01/15/2024    Pollen extract Other (See Comments) 2023            The following portions of the patient's history were reviewed and updated as appropriate: allergies, current medications, past " "family history, past medical history, past social history, past surgical history and problem list.     Past Medical History:   Diagnosis Date    [place] isoimmunization during pregnancy        No past surgical history on file.    Family History   Problem Relation Age of Onset    Heart failure Mother     Heart attack Paternal Grandfather     Stroke Paternal Grandfather     Heart attack Paternal Grandmother     Thyroid disease Paternal Grandmother          Medications have been verified.        Objective   /72 (BP Location: Right arm, Patient Position: Sitting, Cuff Size: Standard)   Pulse 79   Temp 97.7 °F (36.5 °C) (Tympanic)   Resp 16   Ht 5' 3\" (1.6 m)   Wt 66.2 kg (146 lb)   SpO2 99%   BMI 25.86 kg/m²   No LMP recorded.       Physical Exam     Physical Exam  Constitutional:       Appearance: Normal appearance.   Skin:     Comments: Maculopapular rash densely scattered over the bilateral arms and loosely over the torso.  Rash is blanchable   Neurological:      Mental Status: She is alert.   Psychiatric:         Mood and Affect: Mood normal.         Behavior: Behavior normal.                   "

## 2025-03-08 DIAGNOSIS — L50.9 HIVES: ICD-10-CM

## 2025-03-26 RX ORDER — PREDNISONE 20 MG/1
TABLET ORAL
Qty: 10 TABLET | Refills: 0 | OUTPATIENT
Start: 2025-03-26